# Patient Record
Sex: MALE | Race: BLACK OR AFRICAN AMERICAN | NOT HISPANIC OR LATINO | Employment: PART TIME | URBAN - METROPOLITAN AREA
[De-identification: names, ages, dates, MRNs, and addresses within clinical notes are randomized per-mention and may not be internally consistent; named-entity substitution may affect disease eponyms.]

---

## 2017-02-13 ENCOUNTER — COMMUNICATION - HEALTHEAST (OUTPATIENT)
Dept: TELEHEALTH | Facility: CLINIC | Age: 27
End: 2017-02-13

## 2017-02-13 ENCOUNTER — OFFICE VISIT - HEALTHEAST (OUTPATIENT)
Dept: FAMILY MEDICINE | Facility: CLINIC | Age: 27
End: 2017-02-13

## 2017-02-13 DIAGNOSIS — R68.89 FLU-LIKE SYMPTOMS: ICD-10-CM

## 2017-02-13 DIAGNOSIS — B34.9 VIRAL SYNDROME: ICD-10-CM

## 2017-02-13 DIAGNOSIS — J02.9 SORE THROAT: ICD-10-CM

## 2017-02-14 ENCOUNTER — COMMUNICATION - HEALTHEAST (OUTPATIENT)
Dept: FAMILY MEDICINE | Facility: CLINIC | Age: 27
End: 2017-02-14

## 2017-08-02 ENCOUNTER — TELEPHONE (OUTPATIENT)
Dept: OTHER | Facility: OUTPATIENT CENTER | Age: 27
End: 2017-08-02

## 2017-08-02 NOTE — TELEPHONE ENCOUNTER
Telephone Intake TG Adult  Date: 2017  Client Name:  Marion Latham  Preferred Name: Katherine    MRN: [id number]  : 1990     Age:  27  Caller Name: Self  Relationship to Client:      Presenting Problem / Reason for Assessment   (Clinical History &Symptoms):     FTM - Has not been on HRT for a least 1 yr due to Ins.    On and Off HRT for 4 yrs.  Family support:  Dad supportive, Mom - working on it  To finally complete transitioning        Clarify their goals/expected services from  medical care what are they looking for?    Clarify with patient (as necessary) that we are not a primary care clinic and that we do not have a surgeon. We strongly recommend that patients have a primary care doctor for their overall health needs, and we can refer to primary care clinics. We can assist with surgery referrals.  Surgical intervention (mastectomy) future        Length of time experiencing symptoms:  Since age 9      Seen Other Providers for Gender (if so, where):    M.D/other.(hormones) : HRT start  thru [facility]             Therapist:  [facility] - has moved out of state    Psychiatrist:  Dr. Perez ([facility]    Releases of Information sent to patient for completion        Other Medical/Mental Health Diagnoses:                       Gender Dysphoria                     Bi Polor                     Boarderline Personality      If needed, clarify if patient calling from group home or other supervised living arrangement - NA    Note if patient has no mental health or cognitive diagnosis, but phone behavior suggests impairment - NA      Medications:  NA      Primary Care Provider: No - Will be establishing care with [facility]                        Referral Source:  Word of Mouth      Follow Up:        Please Verify Registration    If patient has Docracy or Compete, send to .     Prep Welcome Packet and have patient come half hour beforehand to fill out  forms in packet (health history form, transgender history, Safety Screening, PHQ9, and ANDRIA-7.      Paperwork sent 8/2/2017  Appt: August 21 @ 11:20

## 2017-08-29 ENCOUNTER — OFFICE VISIT (OUTPATIENT)
Dept: OTHER | Facility: OUTPATIENT CENTER | Age: 27
End: 2017-08-29

## 2017-08-29 DIAGNOSIS — F64.0 GENDER DYSPHORIA IN ADOLESCENT AND ADULT: Primary | ICD-10-CM

## 2017-08-29 ASSESSMENT — ANXIETY QUESTIONNAIRES
3. WORRYING TOO MUCH ABOUT DIFFERENT THINGS: NOT AT ALL
1. FEELING NERVOUS, ANXIOUS, OR ON EDGE: SEVERAL DAYS
6. BECOMING EASILY ANNOYED OR IRRITABLE: SEVERAL DAYS
5. BEING SO RESTLESS THAT IT IS HARD TO SIT STILL: NOT AT ALL
GAD7 TOTAL SCORE: 4
2. NOT BEING ABLE TO STOP OR CONTROL WORRYING: NOT AT ALL
7. FEELING AFRAID AS IF SOMETHING AWFUL MIGHT HAPPEN: NOT AT ALL

## 2017-08-29 ASSESSMENT — PATIENT HEALTH QUESTIONNAIRE - PHQ9: 5. POOR APPETITE OR OVEREATING: MORE THAN HALF THE DAYS

## 2017-08-29 NOTE — MR AVS SNAPSHOT
After Visit Summary   2017    Marion Latham    MRN: [id number]           Patient Information     Date Of Birth          1990        Visit Information        Provider Department      2017 2:00 PM Alireza Juarez MD [facility]        Today's Diagnoses     Gender dysphoria in adolescent and adult    -  1       Follow-ups after your visit        Follow-up notes from your care team     Return when ready for hormone therapy.      Who to contact     Please call your clinic at [phone] to:    Ask questions about your health    Make or cancel appointments    Discuss your medicines    Learn about your test results    Speak to your doctor   If you have compliments or concerns about an experience at your clinic, or if you wish to file a complaint, please contact [facility] Patient Relations at [phone] or email us at [email or web address]         Additional Information About Your Visit        MyChart Information     TaskEasy is an electronic gateway that provides easy, online access to your medical records. With TaskEasy, you can request a clinic appointment, read your test results, renew a prescription or communicate with your care team.     To sign up for [employer]Chopt visit the website at [email or web address]   You will be asked to enter the access code listed below, as well as some personal information. Please follow the directions to create your username and password.     Your access code is: [email or web address]  Expires: 2017  1:52 PM     Your access code will  in 90 days. If you need help or a new code, please contact your [facility] or call [phone] for assistance.        Care EveryWhere ID     This is your Care EveryWhere ID. This could be used by other organizations to access your [facility] medical records  [id number]        Your Vitals Were     Pulse Height BMI (Body Mass Index)        "      67 1.676 m (5' 6\") 42.51 kg/m2          Blood Pressure from Last 3 Encounters:   08/29/17 133/87    Weight from Last 3 Encounters:   08/29/17 119.5 kg (263 lb 6.4 oz)               Primary Care Provider    Provider Unknown       No address on file        Equal Access to Services     ANTHONY GARCIA : Hadii aad ku hadasho Soomaali, waaxda luqadaha, qaybta kaalmada adeegyada, waxwilian hermiloin hayaan glenn stevens laascencionn ah. So [facility] [place] [phone].    ATENCIÓN: Si habla español, tiene a caraballo disposición servicios gratuitos de asistencia lingüística. Llame al [phone].    We comply with applicable federal civil rights laws and [place] laws. We do not discriminate on the basis of race, color, national origin, age, disability sex, sexual orientation or gender identity.            Thank you!     Thank you for choosing [facility]  for your care. Our goal is always to provide you with excellent care. Hearing back from our patients is one way we can continue to improve our services. Please take a few minutes to complete the written survey that you may receive in the mail after your visit with us. Thank you!             Your Updated Medication List - Protect others around you: Learn how to safely use, store and throw away your medicines at www.disposemymeds.org.          This list is accurate as of: 8/29/17 11:59 PM.  Always use your most recent med list.                   Brand Name Dispense Instructions for use Diagnosis    ABILIFY PO      Take  by mouth.        CENTROVITE Tabs      Take  by mouth.        EFFEXOR PO      Take  by mouth.        FERROUS SULFATE PO      Take  by mouth.        traZODone 50 MG tablet    DESYREL     Take  mg by mouth          "

## 2017-09-07 RX ORDER — TRAZODONE HYDROCHLORIDE 50 MG/1
50-100 TABLET, FILM COATED ORAL
COMMUNITY
Start: 2017-03-21 | End: 2020-07-08

## 2017-09-08 VITALS
HEART RATE: 67 BPM | HEIGHT: 66 IN | WEIGHT: 263.4 LBS | SYSTOLIC BLOOD PRESSURE: 133 MMHG | DIASTOLIC BLOOD PRESSURE: 87 MMHG | BODY MASS INDEX: 42.33 KG/M2

## 2017-09-08 PROBLEM — F64.0 GENDER DYSPHORIA IN ADOLESCENT AND ADULT: Status: ACTIVE | Noted: 2017-09-08

## 2017-09-08 NOTE — PROGRESS NOTES
Transgender psychosocial assessment and medical evaluation.      ID:  27 year-old transmasculine patient.      CHIEF CONCERN:  Restarting masculinizing hormone therapy.      HISTORY OF PRESENT ILLNESS:    Gender History:  Pt has a longstanding history of gender dysphoria.  He first recognized gender nonconformity around age 6 years old, when he tried to urinate standing up. He continued to experience gender differences in gender dysphoria throughout elementary and middle school; would try and stuff material into his pants to create the sensation of male genitalia; felt that something was missing. He would wear layers of clothing to hide breasts; this created conflict within the family.  He and his family were active in the [employer], he felt conflicted and ashamed about their gender dysphoria and gender differences. He came out in [employer] to his family but it was not until he went away to college that he was able to express gender and feel more comfortable being more masculine and authentic.       During his time at [facility], he began experiencing mental health disturbances and was ultimately hospitalized several times; was diagnosed with bipolar disorder and borderline personality in 2009 and ended up taking medical leave from [facility]. He was recommended to return to the [facility] for further treatment.  [facility]  at the end of 2009 to 2010; and then he saw Dr. Callum Gil at [facility] for gender therapy when returned to the [facility].  However, since that time, Dr. Nolen has moved out of state and is now looking for new overall and gender related provider.     Hormone History:  Patient began masculinization with testosterone therapy in 2013 with the support of Callum Gil. At that time, pt's wife and twin sister were supportive.  He had a job and was psychiatrically stable.  He began hormone therapy with Dr. Ortiz  Luis in [place], [place], using intramuscular testosterone on a weekly basis. The dose is unclear at this time; patient recalls being on 0.5 mL.  He continued with Dr. Smith w/ the dose being doubled and remained on this dose for several months. Then his insurance was discontinued and he could not afford office visits or medication. He was off testosterone at  that time for 3-4 months,  and then restarted hormones with  Dr. Racquel Garcia at [facility] in 2014; was placed on IM testosterone every 2 weeks.  Dose is unknown - perhaps 1 mL.  This continued for 8-9 months until Dr Garcia was not in network.  He was then off testosterone for 6 months and restarted with a Dr. Sanjeev ERYNOSO at [facility]-again approximately 1 mL every 2 weeks, and increasing to 1-1/2 MLs every 2 weeks.  This continued for 9-10 months.  Pt then moved and changed providers to Dr. CARLENE Feliciano in [facility]; continued on the same dosage for 8 months. Patient then lost insurance and has been without testosterone therapy for over a year.  He has recently restarted a new job with full health benefits and is returning for restart of hormones.  In total, he has received approximately  32 months of  hormone therapy, dosages unknown.       Prior to hormones patient's periods were regular.  He had some mild degree of chin hair.  Menses stopped within the use of the first dose of hormone therapy and even hormones stopped he remained amenorrheic for 5-6 months afterwards. He noted with hormone therapy significant increases in body and facial hair, his shoulders became broader and face thinner. It was easier to lose weight, had increased libido & energy, and he felt his mood became more level. He had less mood swings.  He did notice an increase in voice changes, and increased acne.  He denied any significant side effects from hormone therapy except perhaps for increased joint pain and appetite, but overall anemia he had experienced  prior had improved. After stopping hormone therapy, facial hair, body hair were reduced.  Periods returned and became regular again-last menstrual period was August 08/25/17.  Otherwise, they are moderate to heavy flow lasting 3-5 days.       PAST Medical History: hospitalized in 2009 and 2010 with bipolar disorder at [facility]. Was in a group home in 2010 to receive help quicker; has been hospitalized a total of 4 times for psychiatric illness. No surgeries, no other hospitalizations. 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  Pt is also a twin.      FAMILY HISTORY:  Mother with depression, anemia; father's medical history is unknown-there is depression and chemical dependency on his side of the family.  Paternal grandfather with bipolar disorder & schizophrenia.  Sister with depression and ulcers.  Maternal grandmother with lipids, hypertension, chemical dependency and anemia; grandfather with hypertension, possible TB and heart disease, maternal uncle with stroke.  Maternal aunt with diabetes, and thyroid problems on the maternal side.      SOCIAL HISTORY:  The patient smokes 2-3 cigarettes per week.  Alcohol use twice a month, 1 drink per sitting, last marijuana use was in 2016.  Standard diet including dairy.  Activity: engages in some cardio, push-ups and running daily.  He is looking for a new primary care provider.      SEXUAL History: has had 1 partner in the lifetime, which is his wife.  No STIs; past Pap tests have all been normal and is due for one in 1 year; has been tested negative for HIV and is hep B vaccinated.  Has done 2 out of the 3 HPV vaccines in [place].      PHYSICAL EXAM:   GENERAL:  The patient is alert and in no apparent distress.  Blood pressure using a large cuff on the left arm 133/87; pulse 67, height 5 foot 6.  Weight 263.4 pounds.  Speech regular rate and rhythm, mood appears euthymic.  No psychomotor  changes.  Thought processes appropriate.  Pupils equal and reactive to light, disks sharp bilaterally without hemorrhages or exudates.  Oropharynx with no lesions, no erythema.   NECK:  Supple, no thyroid enlargement, no carotid bruits, no adenopathy.   HEART:  S1S2, no murmurs.   LUNGS:  Clear to auscultation bilaterally.   ABDOMEN:  Soft, nontender, normal bowel sounds, no liver or spleen enlargement, no masses.   EXTREMITIES:  Positive pedal pulses bilaterally.  Feet are small with absence of toes on the left foot completely and small or absent toes on the right foot.       ASSESSMENT:   1.  Gender dysphoria.  History of accumulated approximately 32 months of intramuscular testosterone therapy, while being off of testosterone for 1 year most recently; dose of previous hormone use unknown.  No recent laboratory work available.     Risks and benefits of masculinizing hormone therapy were again reviewed with the patient, particularly in light of patient's obesity and mood disorders.  --Will request records from previous providers and order current baseline labs including hemoglobin A1c, lipid panel, hepatic panel and fasting glucose, current testosterone level.  --- Patient has homemade tattoos also noted on exam. Will get a hepatitis C antibody.   --Given pt's history of bipolar disorder would not wish to start hormone therapy until he has reestablished with a psychiatrist and is back on medication therapy, discussed the importance of maintaining mental health care with patient.  --would reintroduce an appropriate dosage of intramuscular hormones, since apparently pt had tolerated them in the past without intercurrent hospitalizations. Would want to keep the dose non-super physiologic and monitor mood appropriately.     2.  Obesity.  We will check hemoglobin A1c & TSH and recommend ongoing exercise and appropriate dietary measures to control potential for weight gain.   Follow up after labs are done and patient has  established care with Psychiatry.

## 2017-09-11 ASSESSMENT — PATIENT HEALTH QUESTIONNAIRE - PHQ9: SUM OF ALL RESPONSES TO PHQ QUESTIONS 1-9: 10

## 2017-09-12 ASSESSMENT — ANXIETY QUESTIONNAIRES: GAD7 TOTAL SCORE: 4

## 2017-09-12 NOTE — PROGRESS NOTES
ID:  27-year-old transmasculine patient.      CHIEF CONCERN:  To restart masculinizing hormone therapy.      HISTORY OF PRESENT ILLNESS:     1.  Gender history. Patient first recognized their gender nonconforming around age 7-8, when they first tried to urinate standing up. This gender nonconformity and dysphoria continued through middle school.  They felt something missing regarding their genitalia; would often try and stuff something into their pants, and they would dress in layers due to discomfort with their chest. They came out in middle school to their family; patient grew up in the [employer] and was quite conflicted and felt ashamed or disgusted by their gender nonconformity-it wasn't until they went away to college that they were able to be more comfortable with their authentic self.  They have been working with Dr. Callum Gil at [facility] regarding gender therapy over their gender dysphoria.  Pt has experienced significant psychological distress during their time at [facility] and started working with Dr. Gil at that time. They were diagnosed with bipolar disorder and borderline personality disorder in 2009, and were admitted to the hospital for psychiatric concerns a few times; was recommended to come back to the [facility] for mental health treatment as well as treatment for their gender issues.      Hormone treatment history:  Pt started testosterone treatment in 2013 with the support of Callum Gil. The patient's wife and twin sister were supportive.  At that time, pt had a job and was psychiatrically stable. Pt initially was started on testosterone with Dr. Smith in [place], [place]. The initial dose is unclear; according to the patient was started on 10 mg IM weekly.  Pt describes this dose as 0.05 MLs weekly-this was increased to 20 mg weekly and continued over the course of several months.  At that point, his insurance changed and could not afford to see   Luis or do medications.  Patient was off testosterone for about 3-4 months in 2013; started again with Dr. Clement ___ at [facility] and in 2014 was on 30 mg, apparently, every 2 weeks.  This continued for about 8-9 months and insurance changed again; patient was off testosterone for about 6 months.  He restarted with Dr. Pace (last name is unclear) at [facility] in [place] on 30 mg every 2 weeks, and eventually 40 mg every 2 weeks from 9/2015, and continued for 9-10 months.  He then moved and was not close to the [facility] and changed to DrPati _______ at [facility] in [place]; was continued on 40 mg every 2 weeks for the next 8 months-insurance then was topped at 7 months and has not had testosterone for 1 year.  He moved back to [place] and now has new job with ________.      Medical History: prior to hormones patient's periods were regular; periods stopped with just the 30 mg dose and ptwould go 5-6 months even after this dose was stopped before would then begin spotting again. He noted increased body and facial hair, although he had some facial hair prior to hormones and also, had some mild body hair. However, after beginning hormone therapy had developed sideburns and some small beard; after stopping hormones this became significantly thinner and pt lost the beard afterwards. Shoulders became broader, face thinner, lost weight, increased libido and increased energy; mood became more level and had decreased mood swings.  Pt experienced acne and some voice pitch change.  Pt denies any side effects from testosterone therapy; noted that appetite is increased.  There was a mild increase in joint pain but anemia improved.  Pt is looking for new therapist as Callum has moved out of [facility]; has an appointment with [facility] to reestablish psychiatry next week.     Current medical problems include a history of anemia, childhood asthma, bipolar disorder-unclear whether this is bipolar 1 or 2, last episode of  depressed mood was 2 weeks ago and hypomanic mood was 1-1/2 weeks ago; has borderline personality disorder.     Current medications:  patient's psychiatrist retired recently and has been off psychiatric medications for 1 year; previously had been on lithium and Abilify and these have been very helpful for the patient. Pt is also on iron 325 mg daily; of note, patient describes a manic episode of having a burst of energy, talking fast, will sometimes rock back and forth, hyperventilate a bit, and has decreased sleep needs.  Of note, last week patient rarely heard voices. Pt has done DBT in the past; will sometimes feel emotionally disconnected, distant or irritable. With a decreased mood, pt has had no suicidal ideation over the last 2 weeks but 2 months ago had some severe depression.      PAST Medical History: hospitalized in 2009 and 2010 with bipolar disorder at [facility]. Was in a group home in 2010 to receive help quicker; has been hospitalized a total of 4 times for psychiatric illness. No surgeries, no other hospitalizations. 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  Pt is also a twin.      FAMILY HISTORY:  Mother with depression, anemia; father's medical history is unknown-there is depression and chemical dependency on his side of the family.  Paternal grandfather with bipolar disorder & schizophrenia.  Sister with depression and ulcers.  Maternal grandmother with lipids, hypertension, chemical dependency and anemia; grandfather with hypertension, possible TB and heart disease, maternal uncle with stroke.  Maternal aunt with diabetes, and thyroid problems on the maternal side.      SOCIAL HISTORY:  The patient smokes 2-3 cigarettes per week.  Alcohol use twice a month, 1 drink per sitting, last marijuana use was in 2016.  Standard diet including dairy.  Activity: engages in some cardio, push-ups and running daily.  He is  looking for a new primary care provider.      SEXUAL History: has had 1 partner in the lifetime, which is his wife.  No STIs; past Pap tests have all been normal and is due for one in 1 year; has been tested negative for HIV and is hep B vaccinated.  Has done 2 out of the 3 HPV vaccines in [place].      PHYSICAL EXAM:   GENERAL:  The patient is alert and in no apparent distress.  Blood pressure using a large cuff on the left arm 133/87; pulse 67, height 5 foot 6.  Weight 263.4 pounds.  Speech regular rate and rhythm, mood appears euthymic.  No psychomotor changes.  Thought processes appropriate.  Pupils equal and reactive to light, disks sharp bilaterally without hemorrhages or exudates.  Oropharynx with no lesions, no erythema.   NECK:  Supple, no thyroid enlargement, no carotid bruits, no adenopathy.   HEART:  S1S2, no murmurs.   LUNGS:  Clear to auscultation bilaterally.   ABDOMEN:  Soft, nontender, normal bowel sounds, no liver or spleen enlargement, no masses.   EXTREMITIES:  Positive pedal pulses bilaterally.  Feet are small with absence of toes on the left foot completely and small or absent toes on the right foot.

## 2017-09-13 NOTE — PROGRESS NOTES
Patient's current medical problems include history of anemia, childhood asthma, depression, borderline personality disorder and bipolar disorder.  The patient is not currently on any medications.  Pt's psychiatrist retired recently and the patient has been off medications x1 year. Had been on lithium and Abilify; continues to have now cycling manic and depressive episodes, with the last manic episode 1-1/2 weeks ago and last depressed episode 2 weeks ago.  Pt describes manic episodes as being characterized by bursts of energy and fast speech, decreased sleep needs-has in the past had auditory hallucinations. Pt has done DBT, sometimes feeling emotionally disconnected or irritable.  Pt has had depressed mood, but no suicidal ideation over the last 2 weeks; did have severe depression 2 months ago.  He does have an appointment at [facility] for psychiatry next week.      PAST Medical History: hospitalizations in 2009 and 2010 for bipolar disorder, and recalls a total of 4 hospitalizations for psychiatric illness.  No surgeries or other hospitalizations. The patient was born prematurely by 2 months and is a twin; his feet did not fully develop and are quite small in size and are missing toes on both feet.      FAMILY HISTORY:  Mother with depression and anemia.  Father medical history is unknown; history of depression and chemical dependency on his side of the family.  Paternal grandmother with bipolar disorder or schizophrenia.  Sister with depression and ulcers.  Paternal grandmother with lipids, hypertension and chemical dependency and anemia; grandfather with hypertension, heart disease, possible TB; maternal uncle with stroke.  Maternal aunt with diabetes.  Maternal side of the family with hypothyroid issues.      SOCIAL HISTORY:  The patient smokes 2-3 cigarettes a week, alcohol twice a month, 1 drink per sitting.  Past marijuana use with last use in 2016.  Eats a standard diet including dairy.  Pt does daily activity  with cardio, pushups, or running; has no primary care provider and is looking to find a new one.      SEXUAL HISTORY:  The patient has 1 partner in the lifetime, his wife. He has never been pregnant.  No STIs.  Has been tested negative for HIV in the past.  Pap test has been normal in the past; due for one in 1 year.  Has been vaccinated against hepatitis B, has received 2/3 HPV vaccines.      PHYSICAL EXAM:   GENERAL:  Pt is alert and in no apparent distress.  Vital signs are within normal limits except for weight, which shows obesity with a body mass index of 42.5.  Speech regular rate and rhythm, mood appears essentially euthymic.  No psychomotor changes.  Thought processes are normal.  Pupils equal and reactive to light, disks sharp bilaterally.  Oropharynx with no lesions.   NECK:  Supple, no thyroid enlargement, no carotid bruits, no adenopathy.   HEART:  S1S2, no murmurs, lungs are clear to auscultation bilaterally.   ABDOMEN:  Soft, nontender, normal bowel sounds, no liver or spleen enlargement, no masses.   EXTREMITIES:  Positive pedal pulses bilaterally.  No edema.  Feet are small with toes as noted above.   NEUROLOGIC:  Cranial nerves 2-12 are intact, deep tendon reflexes 2/4 and symmetric throughout, strength 5/5 and symmetric throughout, gait and station are grossly intact.  There is extra facial and body hair consistent with previous hormone therapy.

## 2017-10-17 DIAGNOSIS — F64.0 GENDER DYSPHORIA IN ADOLESCENT AND ADULT: ICD-10-CM

## 2017-10-17 LAB
ALT SERPL W P-5'-P-CCNC: 45 U/L (ref 0–50)
AST SERPL W P-5'-P-CCNC: 44 U/L (ref 0–45)
CHOLEST SERPL-MCNC: 135 MG/DL
GLUCOSE SERPL-MCNC: 90 MG/DL (ref 70–99)
HBA1C MFR BLD: 5.6 % (ref 4.1–5.7)
HCV AB SERPL QL IA: NONREACTIVE
HDLC SERPL-MCNC: 50 MG/DL
LDLC SERPL CALC-MCNC: 76 MG/DL
NONHDLC SERPL-MCNC: 85 MG/DL
TRIGL SERPL-MCNC: 46 MG/DL
TSH SERPL DL<=0.005 MIU/L-ACNC: 0.69 MU/L (ref 0.4–4)

## 2017-10-19 LAB
SHBG SERPL-SCNC: 35 NMOL/L (ref 30–135)
TESTOST FREE SERPL-MCNC: 0.24 NG/DL (ref 0.08–0.74)
TESTOST SERPL-MCNC: 14 NG/DL (ref 8–60)

## 2017-10-22 ENCOUNTER — HEALTH MAINTENANCE LETTER (OUTPATIENT)
Age: 27
End: 2017-10-22

## 2017-10-23 ENCOUNTER — OFFICE VISIT (OUTPATIENT)
Dept: OTHER | Facility: OUTPATIENT CENTER | Age: 27
End: 2017-10-23

## 2017-10-23 VITALS
BODY MASS INDEX: 42.91 KG/M2 | SYSTOLIC BLOOD PRESSURE: 122 MMHG | HEIGHT: 66 IN | WEIGHT: 267 LBS | HEART RATE: 63 BPM | DIASTOLIC BLOOD PRESSURE: 79 MMHG

## 2017-10-23 DIAGNOSIS — F64.0 GENDER DYSPHORIA IN ADOLESCENT AND ADULT: Primary | ICD-10-CM

## 2017-10-23 RX ORDER — TESTOSTERONE CYPIONATE 200 MG/ML
50 INJECTION, SOLUTION INTRAMUSCULAR WEEKLY
Qty: 2 ML | Refills: 2 | Status: SHIPPED | OUTPATIENT
Start: 2017-10-23 | End: 2020-07-08

## 2017-10-23 RX ORDER — LITHIUM CARBONATE 300 MG
300 TABLET ORAL 3 TIMES DAILY
COMMUNITY

## 2017-10-23 RX ORDER — ACETAMINOPHEN 325 MG/1
325-650 TABLET ORAL
COMMUNITY
Start: 2017-09-28 | End: 2020-07-08

## 2017-10-23 RX ORDER — ONDANSETRON 4 MG/1
4 TABLET, ORALLY DISINTEGRATING ORAL
COMMUNITY
Start: 2017-09-28 | End: 2020-07-08

## 2017-10-23 ASSESSMENT — ENCOUNTER SYMPTOMS
CHEST TIGHTNESS: 0
LIGHT-HEADEDNESS: 0
CHILLS: 0
NERVOUS/ANXIOUS: 0
SHORTNESS OF BREATH: 0
ABDOMINAL PAIN: 0
UNEXPECTED WEIGHT CHANGE: 0
PALPITATIONS: 0
FREQUENCY: 0
POLYDIPSIA: 0
FEVER: 0
NUMBNESS: 0
WEAKNESS: 0
HEADACHES: 0
VOMITING: 0
DYSPHORIC MOOD: 0

## 2017-10-23 NOTE — MR AVS SNAPSHOT
After Visit Summary   10/23/2017    Marion Latham    MRN: [id number]           Patient Information     Date Of Birth          1990        Visit Information        Provider Department      10/23/2017 10:00 AM Alireza Juarez MD [facility]        Today's Diagnoses     Gender dysphoria in adolescent and adult    -  1      Care Instructions    1. Start 50 mg testosterone weekly (0.25 mL)  2. Do lab at [place] in 1 month 3-4 days after injection  3. Reduce starches/carbs in diet  4. Add swimming or biking to activity if possible  5. See Dr. Juarez in 3 months, sooner if needed          Follow-ups after your visit        Follow-up notes from your care team     Return in about 3 months (around 2018).      Who to contact     Please call your clinic at [phone] to:    Ask questions about your health    Make or cancel appointments    Discuss your medicines    Learn about your test results    Speak to your doctor   If you have compliments or concerns about an experience at your clinic, or if you wish to file a complaint, please contact [facility] Patient Relations at [phone] or email us at [email or web address]         Additional Information About Your Visit        MyChart Information     [employer] is an electronic gateway that provides easy, online access to your medical records. With Directly, you can request a clinic appointment, read your test results, renew a prescription or communicate with your care team.     To sign up for [employer] visit the website at [email or web address]   You will be asked to enter the access code listed below, as well as some personal information. Please follow the directions to create your username and password.     Your access code is: [email or web address]  Expires: 2017  1:52 PM     Your access code will  in 90 days. If you need help or a new code, please contact your [facility] or call [phone] for assistance.        Care EveryWhere ID     This is your Care EveryWhere ID. This could be used by other organizations to access your [place] medical records  [id number]        Your Vitals Were     Pulse Height Last Period BMI (Body Mass Index)          63 1.676 m (5' 6\") 09/24/2017 (Approximate) 43.09 kg/m2         Blood Pressure from Last 3 Encounters:   10/23/17 122/79   08/29/17 133/87    Weight from Last 3 Encounters:   10/23/17 121.1 kg (267 lb)   08/29/17 119.5 kg (263 lb 6.4 oz)              Today, you had the following     No orders found for display         Today's Medication Changes          These changes are accurate as of: 10/23/17 11:59 PM.  If you have any questions, ask your nurse or doctor.               Start taking these medicines.        Dose/Directions    needle (disp) 18G X 1\" Misc   Used for:  Gender dysphoria in adolescent and adult        To use to draw up IM medication weekly   Quantity:  25 each   Refills:  3       Needle (Disp) 25G X 1\" Misc   Used for:  Gender dysphoria in adolescent and adult        To use with weekly IM injection   Quantity:  25 each   Refills:  3       syringe (disposable) 1 ML Misc   Used for:  Gender dysphoria in adolescent and adult        To use with IM weekly injections   Quantity:  25 each   Refills:  3       testosterone cypionate 200 MG/ML injection   Commonly known as:  DEPOTESTOTERONE   Used for:  Gender dysphoria in adolescent and adult        Dose:  50 mg   Inject 0.25 mLs (50 mg) into the muscle once a week   Quantity:  2 mL   Refills:  2         Stop taking these medicines if you haven't already. Please contact your care team if you have questions.     EFFEXOR PO                Where to get your medicines      These medications were sent to [facility] [address] [facility] [address] [facility] & [address] [address]     Phone:  [phone]     needle " "(disp) 18G X 1\" Misc    Needle (Disp) 25G X 1\" Misc    syringe (disposable) 1 ML Misc         Some of these will need a paper prescription and others can be bought over the counter.  Ask your nurse if you have questions.     Bring a paper prescription for each of these medications     testosterone cypionate 200 MG/ML injection                Primary Care Provider Office Phone # Fax #    [facility] [phone]       [address] [place] [address]        Equal Access to Services     ANTHONY GARCIA : Hadii aad ku hadasho Soomaali, waaxda luqadaha, qaybta kaalmada adeegyada, waxay hermiloin hayneyda ryder. So [facility] [phone].    ATENCIÓN: Si habla español, tiene a caraballo disposición servicios gratuitos de asistencia lingüística. [facility] [phone].    We comply with applicable federal civil rights laws and [place] laws. We do not discriminate on the basis of race, color, national origin, age, disability, sex, sexual orientation, or gender identity.            Thank you!     Thank you for choosing [facility]  for your care. Our goal is always to provide you with excellent care. Hearing back from our patients is one way we can continue to improve our services. Please take a few minutes to complete the written survey that you may receive in the mail after your visit with us. Thank you!             Your Updated Medication List - Protect others around you: Learn how to safely use, store and throw away your medicines at www.disposemymeds.org.          This list is accurate as of: 10/23/17 11:59 PM.  Always use your most recent med list.                   Brand Name Dispense Instructions for use Diagnosis    ABILIFY PO      Take  by mouth.        acetaminophen 325 MG tablet    TYLENOL     Take 325-650 mg by mouth        CENTROVITE Tabs      Take  by mouth.        FERROUS SULFATE PO      Take  by mouth.        lithium 300 MG tablet      Take 300 mg by mouth 3 " "times daily        needle (disp) 18G X 1\" Misc     25 each    To use to draw up IM medication weekly    Gender dysphoria in adolescent and adult       Needle (Disp) 25G X 1\" Misc     25 each    To use with weekly IM injection    Gender dysphoria in adolescent and adult       ondansetron 4 MG ODT tab    ZOFRAN-ODT     Take 4 mg by mouth        syringe (disposable) 1 ML Misc     25 each    To use with IM weekly injections    Gender dysphoria in adolescent and adult       testosterone cypionate 200 MG/ML injection    DEPOTESTOTERONE    2 mL    Inject 0.25 mLs (50 mg) into the muscle once a week    Gender dysphoria in adolescent and adult       traZODone 50 MG tablet    DESYREL     Take  mg by mouth          "

## 2017-10-23 NOTE — PROGRESS NOTES
KELLY Murphy is a 27 year old individual that uses pronouns He/Him/His/Himself that presents today for follow up of:  masculinizing hormone therapy.   Gender identity: male.   Started Hormone  Therapy: started 2013-216 interimittent  Continues on n/a  Any special concerns today?  He is back on psychiatric medications: Abilify and lithium; working with Dr. Mariana Meyer [facility].   [address].     Pt. Here with wife who is CMA and does most of injections, in deltoid or buttock. Pt. Will self inject in thigh. LMP 9/24/2017  On hormones?  No  Gender related body changes since last visit: n/a    Activity: Walking and working at [employer]  New health concerns since last visit: Dropping weigh--lost 3 lbs  ---    No past surgical history on file.    Patient Active Problem List   Diagnosis     Asthma     Bipolar 1 disorder (H)     Borderline personality disorder     Congenital deformity     Obesity     Gender dysphoria in adolescent and adult       Current Outpatient Prescriptions   Medication Sig Dispense Refill     acetaminophen (TYLENOL) 325 MG tablet Take 325-650 mg by mouth       lithium 300 MG tablet Take 300 mg by mouth 3 times daily       ARIPiprazole (ABILIFY PO) Take  by mouth.       FERROUS SULFATE PO Take  by mouth.       ondansetron (ZOFRAN-ODT) 4 MG ODT tab Take 4 mg by mouth       traZODone (DESYREL) 50 MG tablet Take  mg by mouth       Multiple Vitamins-Minerals (CENTROVITE) TABS Take  by mouth.         History   Smoking Status     Never Smoker   Smokeless Tobacco     Never Used        No Known Allergies    Health Maintenance Due   Topic Date Due     TETANUS IMMUNIZATION (SYSTEM ASSIGNED)  03/11/2007     PAP SCREENING Q3 YR (SYSTEM ASSIGNED)  03/11/2010     EYE EXAM Q1 YEAR  06/17/2012     INFLUENZA VACCINE (SYSTEM ASSIGNED)  09/01/2017         Problem, Medication and Allergy Lists were reviewed and are current..         Review of Systems:   Review of Systems  "  Constitutional: Negative for chills, fever and unexpected weight change.   Eyes: Negative for visual disturbance.   Respiratory: Negative for chest tightness and shortness of breath.    Cardiovascular: Negative for chest pain, palpitations and leg swelling.   Gastrointestinal: Negative for abdominal pain and vomiting.   Endocrine: Negative for polydipsia and polyuria.   Genitourinary: Negative for frequency.   Neurological: Negative for weakness, light-headedness, numbness and headaches.   Psychiatric/Behavioral: Negative for dysphoric mood and suicidal ideas. The patient is not nervous/anxious.             Physical Exam:     Vitals:    10/23/17 1016   BP: 122/79   Pulse: 63   Weight: 121.1 kg (267 lb)   Height: 1.676 m (5' 6\")     BMI= Body mass index is 43.09 kg/(m^2).   Wt Readings from Last 10 Encounters:   10/23/17 121.1 kg (267 lb)   08/29/17 119.5 kg (263 lb 6.4 oz)     Appearance: Male appearance and dress    GENERAL:: healthy, alert and no distress  RESP: lungs clear to auscultation - no rales, no rhonchi, no wheezes  CV: regular rates and rhythm, normal S1 S2, no S3 or S4 and no murmur, no click or rub -  Voice, skin/ Breast, hips per flowsheet    Affect: Appropriate/mood-congruent           Labs:   Results from last visit:  Orders Only on 10/17/2017   Component Date Value Ref Range Status     Testosterone Total 10/17/2017 14  8 - 60 ng/dL Final    Comment: This test was developed and its performance characteristics determined by the   [facility],  [facility]. It has   not been cleared or approved by the FDA. The laboratory is regulated under   CLIA as qualified to perform high-complexity testing. This test is used for   clinical purposes. It should not be regarded as investigational or for   research.       Sex Hormone Binding Globulin 10/17/2017 35  30 - 135 nmol/L Final     Free Testosterone Calculated 10/17/2017 0.24  0.08 - 0.74 ng/dL Final    Comment: " "18-30 Years 0.08-0.74 ng/dL  31-40 Years 0.13-0.92 ng/dL  41-51 Years 0.11-0.58 ng/dL  Postmenopausal 0.06-0.38 ng/dL       Hemoglobin A1C 10/17/2017 5.6  4.1 - 5.7 % Final     Hepatitis C Antibody 10/17/2017 Nonreactive  NR^Nonreactive Final    Comment: Assay performance characteristics have not been established for newborns,   infants, and children       TSH 10/17/2017 0.69  0.40 - 4.00 mU/L Final     Cholesterol 10/17/2017 135  <200 mg/dL Final     Triglycerides 10/17/2017 46  <150 mg/dL Final     HDL Cholesterol 10/17/2017 50  >49 mg/dL Final     LDL Cholesterol Calculated 10/17/2017 76  <100 mg/dL Final    Desirable:       <100 mg/dl     Non HDL Cholesterol 10/17/2017 85  <130 mg/dL Final     ALT 10/17/2017 45  0 - 50 U/L Final     AST 10/17/2017 44  0 - 45 U/L Final     Glucose 10/17/2017 90  70 - 99 mg/dL Final       Assessment and Plan   1 Gender dysphoria  2. Morbid obesity  Start Depo Testosterone 50 mg IM weekly, using 200 mg/mL strength, 1 mL syringe, 23 gauge 1\" needle. Pt. Instructed in how to draw up dose accurately and in IM injection technique.  Check testosterone level day 3-4 after injection in  1 Month    Counseled patient to reduce carbs/starches in diet, add swimming or biking to activity      Follow up:  Follow up in 3 months.  Results by mychart  Questions were elicited and answered.     Alireza Juarez MD    "

## 2017-10-23 NOTE — NURSING NOTE
"Chief Complaint   Patient presents with     RECHECK     TG       Vitals:    10/23/17 1016   BP: 122/79   Pulse: 63   Weight: 121.1 kg (267 lb)   Height: 1.676 m (5' 6\")       Body mass index is 43.09 kg/(m^2).      Rebecca Singh CMA    "

## 2017-10-23 NOTE — PATIENT INSTRUCTIONS
1. Start 50 mg testosterone weekly (0.25 mL)  2. Do lab at [place] in 1 month 3-4 days after injection  3. Reduce starches/carbs in diet  4. Add swimming or biking to activity if possible  5. See Dr. Juarez in 3 months, sooner if needed

## 2020-07-07 ENCOUNTER — VIRTUAL VISIT (OUTPATIENT)
Dept: FAMILY MEDICINE | Facility: OTHER | Age: 30
End: 2020-07-07

## 2020-07-08 ENCOUNTER — HOSPITAL ENCOUNTER (OUTPATIENT)
Facility: CLINIC | Age: 30
Setting detail: OBSERVATION
Discharge: HOME | End: 2020-07-09
Attending: EMERGENCY MEDICINE | Admitting: HOSPITALIST
Payer: OTHER GOVERNMENT

## 2020-07-08 ENCOUNTER — APPOINTMENT (OUTPATIENT)
Dept: GENERAL RADIOLOGY | Facility: CLINIC | Age: 30
End: 2020-07-08
Attending: EMERGENCY MEDICINE
Payer: OTHER GOVERNMENT

## 2020-07-08 DIAGNOSIS — R07.89 CHEST TIGHTNESS: ICD-10-CM

## 2020-07-08 DIAGNOSIS — R68.83 CHILLS: ICD-10-CM

## 2020-07-08 DIAGNOSIS — M79.10 MYALGIA: ICD-10-CM

## 2020-07-08 PROBLEM — R07.9 CHEST PAIN: Status: ACTIVE | Noted: 2020-07-08

## 2020-07-08 LAB
ALBUMIN SERPL-MCNC: 3.3 G/DL (ref 3.4–5)
ALBUMIN UR-MCNC: 10 MG/DL
ALP SERPL-CCNC: 51 U/L (ref 40–150)
ALT SERPL W P-5'-P-CCNC: 36 U/L (ref 0–50)
ANION GAP SERPL CALCULATED.3IONS-SCNC: 3 MMOL/L (ref 3–14)
APPEARANCE UR: CLEAR
APTT PPP: 30 SEC (ref 22–37)
AST SERPL W P-5'-P-CCNC: 25 U/L (ref 0–45)
BACTERIA #/AREA URNS HPF: ABNORMAL /HPF
BASOPHILS # BLD AUTO: 0 10E9/L (ref 0–0.2)
BASOPHILS NFR BLD AUTO: 0 %
BILIRUB DIRECT SERPL-MCNC: <0.1 MG/DL (ref 0–0.2)
BILIRUB SERPL-MCNC: 0.4 MG/DL (ref 0.2–1.3)
BILIRUB UR QL STRIP: NEGATIVE
BUN SERPL-MCNC: 6 MG/DL (ref 7–30)
CALCIUM SERPL-MCNC: 8.7 MG/DL (ref 8.5–10.1)
CHLORIDE SERPL-SCNC: 109 MMOL/L (ref 94–109)
CK SERPL-CCNC: 504 U/L (ref 30–225)
CO2 SERPL-SCNC: 29 MMOL/L (ref 20–32)
COLOR UR AUTO: YELLOW
CREAT SERPL-MCNC: 1.09 MG/DL (ref 0.52–1.04)
CRP SERPL-MCNC: 9 MG/L (ref 0–8)
D DIMER PPP FEU-MCNC: 0.3 UG/ML FEU (ref 0–0.5)
DIFFERENTIAL METHOD BLD: ABNORMAL
EOSINOPHIL # BLD AUTO: 0 10E9/L (ref 0–0.7)
EOSINOPHIL NFR BLD AUTO: 0 %
ERYTHROCYTE [DISTWIDTH] IN BLOOD BY AUTOMATED COUNT: 13.8 % (ref 10–15)
GFR SERPL CREATININE-BSD FRML MDRD: 67 ML/MIN/{1.73_M2}
GLUCOSE SERPL-MCNC: 141 MG/DL (ref 70–99)
GLUCOSE UR STRIP-MCNC: NEGATIVE MG/DL
HCG UR QL: NEGATIVE
HCT VFR BLD AUTO: 41.4 % (ref 35–47)
HGB BLD-MCNC: 13.1 G/DL (ref 11.7–15.7)
HGB UR QL STRIP: NEGATIVE
INR PPP: 1.13 (ref 0.86–1.14)
INTERPRETATION ECG - MUSE: NORMAL
KETONES UR STRIP-MCNC: 5 MG/DL
LEUKOCYTE ESTERASE UR QL STRIP: ABNORMAL
LYMPHOCYTES # BLD AUTO: 0.8 10E9/L (ref 0.8–5.3)
LYMPHOCYTES NFR BLD AUTO: 20 %
MCH RBC QN AUTO: 27.2 PG (ref 26.5–33)
MCHC RBC AUTO-ENTMCNC: 31.6 G/DL (ref 31.5–36.5)
MCV RBC AUTO: 86 FL (ref 78–100)
MONOCYTES # BLD AUTO: 0.5 10E9/L (ref 0–1.3)
MONOCYTES NFR BLD AUTO: 14 %
MUCOUS THREADS #/AREA URNS LPF: PRESENT /LPF
NEUTROPHILS # BLD AUTO: 2.6 10E9/L (ref 1.6–8.3)
NEUTROPHILS NFR BLD AUTO: 66 %
NITRATE UR QL: NEGATIVE
PH UR STRIP: 6 PH (ref 5–7)
PLATELET # BLD AUTO: 184 10E9/L (ref 150–450)
PLATELET # BLD EST: ABNORMAL 10*3/UL
POTASSIUM SERPL-SCNC: 4.1 MMOL/L (ref 3.4–5.3)
PROT SERPL-MCNC: 7.5 G/DL (ref 6.8–8.8)
RBC # BLD AUTO: 4.81 10E12/L (ref 3.8–5.2)
RBC #/AREA URNS AUTO: 1 /HPF (ref 0–2)
RBC MORPH BLD: ABNORMAL
SODIUM SERPL-SCNC: 141 MMOL/L (ref 133–144)
SOURCE: ABNORMAL
SP GR UR STRIP: 1.02 (ref 1–1.03)
SQUAMOUS #/AREA URNS AUTO: 1 /HPF (ref 0–1)
TROPONIN I SERPL-MCNC: 0.04 UG/L (ref 0–0.04)
UROBILINOGEN UR STRIP-MCNC: 2 MG/DL (ref 0–2)
WBC # BLD AUTO: 3.9 10E9/L (ref 4–11)
WBC #/AREA URNS AUTO: 3 /HPF (ref 0–5)

## 2020-07-08 PROCEDURE — 80076 HEPATIC FUNCTION PANEL: CPT | Performed by: PHYSICIAN ASSISTANT

## 2020-07-08 PROCEDURE — U0003 INFECTIOUS AGENT DETECTION BY NUCLEIC ACID (DNA OR RNA); SEVERE ACUTE RESPIRATORY SYNDROME CORONAVIRUS 2 (SARS-COV-2) (CORONAVIRUS DISEASE [COVID-19]), AMPLIFIED PROBE TECHNIQUE, MAKING USE OF HIGH THROUGHPUT TECHNOLOGIES AS DESCRIBED BY CMS-2020-01-R: HCPCS | Performed by: EMERGENCY MEDICINE

## 2020-07-08 PROCEDURE — 80048 BASIC METABOLIC PNL TOTAL CA: CPT | Performed by: EMERGENCY MEDICINE

## 2020-07-08 PROCEDURE — C9803 HOPD COVID-19 SPEC COLLECT: HCPCS

## 2020-07-08 PROCEDURE — 84484 ASSAY OF TROPONIN QUANT: CPT | Performed by: EMERGENCY MEDICINE

## 2020-07-08 PROCEDURE — 85610 PROTHROMBIN TIME: CPT | Performed by: PHYSICIAN ASSISTANT

## 2020-07-08 PROCEDURE — 82550 ASSAY OF CK (CPK): CPT | Performed by: EMERGENCY MEDICINE

## 2020-07-08 PROCEDURE — G0378 HOSPITAL OBSERVATION PER HR: HCPCS

## 2020-07-08 PROCEDURE — 99220 ZZC INITIAL OBSERVATION CARE,LEVL III: CPT | Performed by: PHYSICIAN ASSISTANT

## 2020-07-08 PROCEDURE — 99285 EMERGENCY DEPT VISIT HI MDM: CPT | Mod: 25

## 2020-07-08 PROCEDURE — 84484 ASSAY OF TROPONIN QUANT: CPT | Performed by: PHYSICIAN ASSISTANT

## 2020-07-08 PROCEDURE — 71045 X-RAY EXAM CHEST 1 VIEW: CPT

## 2020-07-08 PROCEDURE — 85025 COMPLETE CBC W/AUTO DIFF WBC: CPT | Performed by: EMERGENCY MEDICINE

## 2020-07-08 PROCEDURE — 93005 ELECTROCARDIOGRAM TRACING: CPT

## 2020-07-08 PROCEDURE — 85730 THROMBOPLASTIN TIME PARTIAL: CPT | Performed by: PHYSICIAN ASSISTANT

## 2020-07-08 PROCEDURE — 85379 FIBRIN DEGRADATION QUANT: CPT | Performed by: PHYSICIAN ASSISTANT

## 2020-07-08 PROCEDURE — 25000132 ZZH RX MED GY IP 250 OP 250 PS 637: Performed by: PHYSICIAN ASSISTANT

## 2020-07-08 PROCEDURE — 81001 URINALYSIS AUTO W/SCOPE: CPT | Performed by: EMERGENCY MEDICINE

## 2020-07-08 PROCEDURE — 87086 URINE CULTURE/COLONY COUNT: CPT | Performed by: PHYSICIAN ASSISTANT

## 2020-07-08 PROCEDURE — 36415 COLL VENOUS BLD VENIPUNCTURE: CPT | Performed by: PHYSICIAN ASSISTANT

## 2020-07-08 PROCEDURE — 81025 URINE PREGNANCY TEST: CPT | Performed by: EMERGENCY MEDICINE

## 2020-07-08 PROCEDURE — 86140 C-REACTIVE PROTEIN: CPT | Performed by: EMERGENCY MEDICINE

## 2020-07-08 PROCEDURE — 93005 ELECTROCARDIOGRAM TRACING: CPT | Mod: 76

## 2020-07-08 PROCEDURE — 80076 HEPATIC FUNCTION PANEL: CPT | Performed by: EMERGENCY MEDICINE

## 2020-07-08 RX ORDER — ACETAMINOPHEN 325 MG/1
975 TABLET ORAL ONCE
Status: DISCONTINUED | OUTPATIENT
Start: 2020-07-08 | End: 2020-07-08

## 2020-07-08 RX ORDER — PROCHLORPERAZINE 25 MG
25 SUPPOSITORY, RECTAL RECTAL EVERY 12 HOURS PRN
Status: DISCONTINUED | OUTPATIENT
Start: 2020-07-08 | End: 2020-07-09 | Stop reason: HOSPADM

## 2020-07-08 RX ORDER — ONDANSETRON 4 MG/1
4 TABLET, ORALLY DISINTEGRATING ORAL EVERY 6 HOURS PRN
Status: DISCONTINUED | OUTPATIENT
Start: 2020-07-08 | End: 2020-07-09 | Stop reason: HOSPADM

## 2020-07-08 RX ORDER — TESTOSTERONE CYPIONATE 200 MG/ML
100 INJECTION, SOLUTION INTRAMUSCULAR WEEKLY
COMMUNITY

## 2020-07-08 RX ORDER — AMOXICILLIN 250 MG
1 CAPSULE ORAL 2 TIMES DAILY PRN
Status: DISCONTINUED | OUTPATIENT
Start: 2020-07-08 | End: 2020-07-09 | Stop reason: HOSPADM

## 2020-07-08 RX ORDER — ONDANSETRON 2 MG/ML
4 INJECTION INTRAMUSCULAR; INTRAVENOUS EVERY 6 HOURS PRN
Status: DISCONTINUED | OUTPATIENT
Start: 2020-07-08 | End: 2020-07-09 | Stop reason: HOSPADM

## 2020-07-08 RX ORDER — AMOXICILLIN 250 MG
2 CAPSULE ORAL 2 TIMES DAILY PRN
Status: DISCONTINUED | OUTPATIENT
Start: 2020-07-08 | End: 2020-07-09 | Stop reason: HOSPADM

## 2020-07-08 RX ORDER — LITHIUM CARBONATE 300 MG/1
300 CAPSULE ORAL 3 TIMES DAILY
Status: DISCONTINUED | OUTPATIENT
Start: 2020-07-08 | End: 2020-07-09 | Stop reason: HOSPADM

## 2020-07-08 RX ORDER — ARIPIPRAZOLE 15 MG/1
15 TABLET ORAL DAILY
Status: DISCONTINUED | OUTPATIENT
Start: 2020-07-08 | End: 2020-07-09 | Stop reason: HOSPADM

## 2020-07-08 RX ORDER — BISACODYL 10 MG
10 SUPPOSITORY, RECTAL RECTAL DAILY PRN
Status: DISCONTINUED | OUTPATIENT
Start: 2020-07-08 | End: 2020-07-09 | Stop reason: HOSPADM

## 2020-07-08 RX ORDER — ACETAMINOPHEN 650 MG/1
650 SUPPOSITORY RECTAL EVERY 4 HOURS PRN
Status: DISCONTINUED | OUTPATIENT
Start: 2020-07-08 | End: 2020-07-09 | Stop reason: HOSPADM

## 2020-07-08 RX ORDER — PROCHLORPERAZINE MALEATE 10 MG
10 TABLET ORAL EVERY 6 HOURS PRN
Status: DISCONTINUED | OUTPATIENT
Start: 2020-07-08 | End: 2020-07-09 | Stop reason: HOSPADM

## 2020-07-08 RX ORDER — NALOXONE HYDROCHLORIDE 0.4 MG/ML
.1-.4 INJECTION, SOLUTION INTRAMUSCULAR; INTRAVENOUS; SUBCUTANEOUS
Status: DISCONTINUED | OUTPATIENT
Start: 2020-07-08 | End: 2020-07-09 | Stop reason: HOSPADM

## 2020-07-08 RX ORDER — ACETAMINOPHEN 325 MG/1
650 TABLET ORAL EVERY 4 HOURS PRN
Status: DISCONTINUED | OUTPATIENT
Start: 2020-07-08 | End: 2020-07-09 | Stop reason: HOSPADM

## 2020-07-08 RX ORDER — ARIPIPRAZOLE 15 MG/1
15 TABLET ORAL DAILY
COMMUNITY

## 2020-07-08 RX ADMIN — ACETAMINOPHEN 650 MG: 325 TABLET, FILM COATED ORAL at 13:15

## 2020-07-08 RX ADMIN — ARIPIPRAZOLE 15 MG: 15 TABLET ORAL at 14:34

## 2020-07-08 RX ADMIN — LITHIUM CARBONATE 300 MG: 300 CAPSULE, GELATIN COATED ORAL at 20:07

## 2020-07-08 RX ADMIN — LITHIUM CARBONATE 300 MG: 300 CAPSULE, GELATIN COATED ORAL at 14:34

## 2020-07-08 ASSESSMENT — MIFFLIN-ST. JEOR: SCORE: 1865.74

## 2020-07-08 NOTE — PHARMACY-ADMISSION MEDICATION HISTORY
"Pharmacy Medication History  Admission medication history interview status for the 7/8/2020  admission is complete. See EPIC admission navigator for prior to admission medications     Medication history sources: Patient  Medication history source reliability: Good  Adherence assessment: Good    Significant changes made to the medication list:  Changed the Testosterone dose  Added abilify dose  Added iron liquid  Removed multivit  Added metformin      Additional medication history information:   none    Medication reconciliation completed by provider prior to medication history? No    Time spent in this activity: 15 min      Prior to Admission medications    Medication Sig Last Dose Taking? Auth Provider   ARIPiprazole (ABILIFY) 15 MG tablet Take 15 mg by mouth daily 7/7/2020 at Unknown time Yes Unknown, Entered By History   ferrous sulfate 300 (60 Fe) MG/5ML syrup Take 300 mg by mouth 2 times daily 7/7/2020 at Unknown time Yes Unknown, Entered By History   lithium 300 MG tablet Take 300 mg by mouth 3 times daily 7/7/2020 at Unknown time Yes Reported, Patient   metFORMIN (GLUCOPHAGE) 500 MG tablet Take 500 mg by mouth daily (with breakfast) 7/7/2020 at Unknown time Yes Unknown, Entered By History   testosterone cypionate (DEPOTESTOSTERONE) 200 MG/ML injection Inject 100 mg into the muscle once a week Tuesdays 6/30/2020 Yes Unknown, Entered By History   needle, disp, 18G X 1\" MISC To use to draw up IM medication weekly   Alireza Juarez MD   Needle, Disp, 25G X 1\" MISC To use with weekly IM injection   Alireza Juarez MD   syringe, disposable, 1 ML MISC To use with IM weekly injections   Alireza Juarez MD       "
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
## 2020-07-08 NOTE — PLAN OF CARE
RECEIVING UNIT ED HANDOFF REVIEW    ED Nurse Handoff Report was reviewed by: Lien Barbosa RN on July 8, 2020 at 12:12 PM

## 2020-07-08 NOTE — PROGRESS NOTES
"Date: 2020 19:44:16  Clinician: Amor Ritchie  Clinician NPI: [id number]  Patient: Swetha Latham  Patient : 1990  Patient Address: [address] [place] [address]  Patient Phone: [phone]  Visit Protocol: URI  Patient Summary:  Swetha is a 30 year old ( : 1990 ) male who initiated a Visit for COVID-19 (Coronavirus) evaluation and screening. When asked the question \"Please sign me up to receive news, health information and promotions. \", Swetha responded \"No\".    Swetha states his symptoms started 1-2 days ago.   His symptoms consist of nausea, tooth pain, vomiting, malaise, ear pain, a headache, a sore throat, myalgia, facial pain or pressure, and a cough. He is experiencing mild difficulty breathing with activities but can speak normally in full sentences. Swetha also feels feverish.   Symptom details     Cough: Swetha coughs a few times an hour and his cough is more bothersome at night. Phlegm does not come into his throat when he coughs. He believes his cough is caused by post-nasal drip.     Sore throat: Swetha reports having moderate throat pain (4-6 on a 10 point pain scale), does not have exudate on his tonsils, and can swallow liquids. The lymph nodes in his neck are not enlarged. A rash has not appeared on the skin since the sore throat started.     Temperature: His current temperature is 100.8 degrees Fahrenheit. Swetha is not sure how long he has had a temperature over 100 degrees Fahrenheit.     Facial pain or pressure: The facial pain or pressure feels worse when bending over or leaning forward.     Headache: He states the headache is severe (7-9 on a 10 point pain scale).     Tooth pain: The tooth pain is not caused by a cavity, recent dental work, or other mouth problems.      Swetha denies having wheezing, ageusia, diarrhea, rhinitis, chills, enlarged lymph nodes, anosmia, and nasal congestion. He also denies having recent facial or sinus surgery in the past 60 days, taking " antibiotic medication in the past month, and having a sinus infection within the past year.   Precipitating events  Within the past week, Swetha has not been exposed to someone with strep throat. He has not recently been exposed to someone with influenza. Swetha has not been in close contact with any high risk individuals.   Pertinent COVID-19 (Coronavirus) information  In the past 14 days, Swetha has not worked in a congregate living setting.   He does not work or volunteer as healthcare worker or a  and does not work or volunteer in a healthcare facility.   Swetha also has not lived in a congregate living setting in the past 14 days. He does not live with a healthcare worker.   Swehta has had a close contact with a laboratory-confirmed COVID-19 patient within 14 days of symptom onset. Additional information about contact with COVID-19 (Coronavirus) patient as reported by the patient (free text): My Sister and brother in law both have confirmed covid 19 , and I stay with them.   Pertinent medical history  Swetha needs a return to work/school note.   Weight: 260 lbs   Swetha does not smoke or use smokeless tobacco.   Additional information as reported by the patient (free text): Bipolar,  borderline personality disorder,  transgender   Weight: 260 lbs    MEDICATIONS: lithium carbonate oral, Depo-Testosterone intramuscular, Abilify oral, metformin oral, anastrozole oral, ALLERGIES: metformin, Depo-Testosterone, lithium, Abilify  Clinician Response:  Dear Swetha,   Your symptoms show that you may have coronavirus (COVID-19). This illness can cause fever, cough and trouble breathing. Many people get a mild case and get better on their own. Some people can get very sick.  What should I do?  We would like to test you for this virus.   1. Please call [phone] to schedule your visit. Explain that you were referred by [facility] to have a COVID-19 test. Be ready to share your [facility] visit ID number.  The following  "will serve as your written order for this COVID Test, ordered by me, for the indication of suspected COVID [Z20.828]: The test will be ordered in [employer], our electronic health record, after you are scheduled. It will show as ordered and authorized by David Bergeron MD.  Order: COVID-19 (Coronavirus) PCR for [facility]MPTOMATIC testing from [facility].      2. When it's time for your COVID test:  Stay at least 6 feet away from others. (If someone will drive you to your test, stay in the backseat, as far away from the  as you can.)   Cover your mouth and nose with a mask, tissue or washcloth.  Go straight to the testing site. Don't make any stops on the way there or back.      3.Starting now: Stay home and away from others (self-isolate) until:   You've had no fever---and no medicine that reduces fever---for 3 full days (72 hours). And...   Your other symptoms have gotten better. For example, your cough or breathing has improved. And...   At least 10 days have passed since your symptoms started.       During this time, don't leave the house except for testing or medical care.   Stay in your own room, even for meals. Use your own bathroom if you can.   Stay away from others in your home. No hugging, kissing or shaking hands. No visitors.  Don't go to work, school or anywhere else.    Clean \"high touch\" surfaces often (doorknobs, counters, handles, etc.). Use a household cleaning spray or wipes. You'll find a full list of  on the EPA website: www.epa.gov/pesticide-registration/list-n-disinfectants-use-against-sars-cov-2.   Cover your mouth and nose with a mask, tissue or washcloth to avoid spreading germs.  Wash your hands and face often. Use soap and water.  Caregivers in these groups are at risk for severe illness due to COVID-19:  o People 65 years and older  o People who live in a nursing home or long-term care facility  o People with chronic disease (lung, heart, cancer, diabetes, kidney, liver, immunologic)  o People " who have a weakened immune system, including those who:   Are in cancer treatment  Take medicine that weakens the immune system, such as corticosteroids  Had a bone marrow or organ transplant  Have an immune deficiency  Have poorly controlled HIV or AIDS  Are obese (body mass index of 40 or higher)  Smoke regularly   o Caregivers should wear gloves while washing dishes, handling laundry and cleaning bedrooms and bathrooms.  o Use caution when washing and drying laundry: Don't shake dirty laundry, and use the warmest water setting that you can.  o For more tips, go to www.cdc.gov/coronavirus/2019-ncov/downloads/10Things.pdf.    4.Sign up for [employer]. We know it's scary to hear that you might have COVID-19. We want to track your symptoms to make sure you're okay over the next 2 weeks. Please look for an email from [employer]---this is a free, online program that we'll use to keep in touch. To sign up, follow the link in the email. Learn more at [email or web address]  How can I take care of myself?   Get lots of rest. Drink extra fluids (unless a doctor has told you not to).   Take Tylenol (acetaminophen) for fever or pain. If you have liver or kidney problems, ask your family doctor if it's okay to take Tylenol.   Adults can take either:    650 mg (two 325 mg pills) every 4 to 6 hours, or...   1,000 mg (two 500 mg pills) every 8 hours as needed.    Note: Don't take more than 3,000 mg in one day. Acetaminophen is found in many medicines (both prescribed and over-the-counter medicines). Read all labels to be sure you don't take too much.   For children, check the Tylenol bottle for the right dose. The dose is based on the child's age or weight.    If you have other health problems (like cancer, heart failure, an organ transplant or severe kidney disease): Call your specialty clinic if you don't feel better in the next 2 days.       Know when to call 911. Emergency warning signs include:    Trouble  breathing or shortness of breath Pain or pressure in the chest that doesn't go away Feeling confused like you haven't felt before, or not being able to wake up Bluish-colored lips or face.  Where can I get more information?   [facility] -- About COVID-19: [email or web address]   CDC -- What to Do If You're Sick: www.cdc.gov/coronavirus/2019-ncov/about/steps-when-sick.html   CDC -- Ending Home Isolation: www.cdc.gov/coronavirus/2019-ncov/hcp/disposition-in-home-patients.html   CDC -- Caring for Someone: www.cdc.gov/coronavirus/2019-ncov/if-you-are-sick/care-for-someone.html   [facility] -- Interim Guidance for Hospital Discharge to Home: www.[facility].mn.us/diseases/coronavirus/hcp/hospdischarge.pdf   [facility] clinical trials (COVID-19 research studies): clinicalaffairs.[facility]-clinical-trials    Below are the COVID-19 hotlines at the [facility] ([facility]). Interpreters are available.    For health questions: Call 891-760-6348 or 1-463.313.3321 (7 a.m. to 7 p.m.) For questions about schools and childcare: Call 702-719-9480 or 1-445.134.7104 (7 a.m. to 7 p.m.)    Diagnosis: Cough  Diagnosis ICD: R05

## 2020-07-08 NOTE — PLAN OF CARE
[id number]: A&Ox4. VSS on RA. Up independently. Regular diet. Tyelnol prn. Generalized body aches. IV SL. Trops negative x3. Echo yet to be done. Awaiting COVID results.

## 2020-07-08 NOTE — PLAN OF CARE
A&Ox4, VSS on RA. C/o 7/10 headache pain managed by PRN tylenol. Up ad armani. Reg diet, voiding adequately. IV SL. Trops neg x2. Plan for Echo. Awaiting COVID results.

## 2020-07-08 NOTE — ED NOTES
[facility]  ED Nurse Handoff Report    ED Chief complaint: Fever (Fever, cough, SOB, headaches. Pt's family has tested positive for covid.)      ED Diagnosis:   Final diagnoses:   Myalgia   Chest tightness   Chills       Code Status: Full Code    Allergies: No Known Allergies    Patient Story: Pt has family members pos for covid, has fevers, headaches and shortness of breath with a cough this week.  Focused Assessment:  Pt. A/O can make needs known. Denies chest pain at this time, has infrequent cough.    Treatments and/or interventions provided: Labs, Xray  Patient's response to treatments and/or interventions: Well    To be done/followed up on inpatient unit:  Monitor    Does this patient have any cognitive concerns?: None    Activity level - Baseline/Home:  Independent  Activity Level - Current:   Independent    Patient's Preferred language: English   Needed?: No    Isolation: None  Infection: R/O covid-19    Bariatric?: No    Vital Signs:   Vitals:    07/08/20 0930 07/08/20 1000 07/08/20 1050 07/08/20 1110   BP:  129/85     Pulse:  83     Temp:       TempSrc:       SpO2: 98% 98% 96% 100%   Weight:       Height:           Cardiac Rhythm:     Was the PSS-3 completed:   Yes  What interventions are required if any?               Family Comments: Pt has family members tested positive for covid-19  OBS brochure/video discussed/provided to patient/family: N/A              Name of person given brochure if not patient: NA              Relationship to patient: NA    For the majority of the shift this patient's behavior was Green.   Behavioral interventions performed were None.    ED NURSE PHONE NUMBER: RN [phone]

## 2020-07-08 NOTE — PLAN OF CARE
"PRIMARY DIAGNOSIS: \"GENERIC\" NURSING  OUTPATIENT/OBSERVATION GOALS TO BE MET BEFORE DISCHARGE:  1. ADLs back to baseline: Yes    2. Activity and level of assistance: Ambulating independently.    3. Pain status: Improved-controlled with oral pain medications.    4. Return to near baseline physical activity: Yes     Discharge Planner Nurse   Safe discharge environment identified: No  Barriers to discharge: Yes       Entered by: Lien Barbosa 07/08/2020 2:00 PM     Please review provider order for any additional goals.   Nurse to notify provider when observation goals have been met and patient is ready for discharge.  "

## 2020-07-08 NOTE — PROGRESS NOTES
OUTPATIENT/OBSERVATION GOALS TO BE MET BEFORE DISCHARGE:  1. ADLs back to baseline: Yes     2. Activity and level of assistance: Ambulating independently.     3. Pain status: Improved-controlled with oral pain medications.     4. Return to near baseline physical activity: Yes

## 2020-07-08 NOTE — PROGRESS NOTES
Hospitalist admission note dictated. Confirmation #: [id number].    JoAnna Barthell, PA-C  7/8/2020   2:29 PM

## 2020-07-08 NOTE — H&P
Admitted:     07/08/2020      HOSPITALIST ADMISSION      DATE OF ADMISSION:  07/08/2020      PRIMARY CARE PROVIDER:  [facility].      CHIEF COMPLAINT:  Fever.      History is provided by the patient.      HISTORY OF PRESENT ILLNESS:  Pan Albarran is a 30-year-old transmale with bipolar disorder, history of asthma, and borderline personality disorder who presented for COVID-19 testing.  He reports that approximately 6 days ago he developed vomiting, chills, body aches, dry cough, mild dyspnea on exertion, and bilateral chest discomfort. The vomiting has since resolved. His max temperature has been 100.8 degrees Fahrenheit.  He has been taking Tylenol with some improvement.  He lives with his twin sister and brother-in-law who both recently tested positive for COVID-19.      In the Emergency Department, the patient was evaluated by Dr. Massiel Brown.  Vitals there showed the patient with an elevated temperature of 99.3, normal heart rate, overall normal blood pressure and no hypoxia.  Basic labs were obtained and revealed a creatinine of 1.09 with GFR of 67 as well as leukopenia of 3.9.  An EKG was obtained and showed normal sinus rhythm without acute ischemic changes.  Initial troponin returned detectable but within reference range at 0.036, and serial evaluation  2 hours later was slightly higher at 0.042.  Chest x-ray does not show any acute cardiopulmonary disease.     Chest discomfort described as an aching sensation with tightness that sometimes is felt in left arm.  It is waxing and waning in severity and has been intermittent over the last several days, but more persistent over the course of today.  It is not worse with exertion and, in fact, he notices it more when he is still.  Slightly worse with deep breathing.  The patient has no known history of hypertension, high cholesterol or diabetes.  He does take metformin, it sounds, as part of gender dysphoria treatment.        PAST  MEDICAL HISTORY:   1.  Bipolar 2 disorder.   2.  Borderline personality disorder.   3.  Gender dysphoria.   4.  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   5.  Obesity.      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         History of childhood asthma.        FAMILY HISTORY:  Grandfather with initial MI at age 65 and again in his early 70s.      SOCIAL HISTORY:  The patient does not smoke.  He occasionally drinks alcohol, about every 6 months.  He does not use illicit drugs.        PRIOR TO ADMISSION MEDICATIONS:   1.  Abilify 50 mg daily.   2.  Ferrous sulfate 300 mg per 5 mL syrup 300 mg b.i.d.   3.  Lithium 300 mg t.i.d.   4.  Metformin 500 mg daily with breakfast.   5.  Depo-testosterone 200 mg per mL injection, inject 100 mg intramuscularly once a week on Tuesdays.      ALLERGIES:  NO KNOWN DRUG ALLERGIES.      REVIEW OF SYSTEMS:  A complete review of systems was performed and is negative except for that noted in the HPI.      PHYSICAL EXAMINATION:   Formal bedside exam was not performed in effort to conserve PPE in setting of COVID-19 pandemic and high suspicion for COVID-positive patient. Please refer to ED provider note for more detailed exam -- no murmurs, rubs or adventitious breath sounds on cardiac or respiratory ascultation.  VITAL SIGNS:  Blood pressure 135/81, heart rate 80, temperature 97.3, respirations 16, oxygen saturation 99%.   GENERAL:  Pleasant transmale who appears stated age and looks comfortable sitting upright.     SKIN:  Appears mildly diaphoretic.  No rash or lesions on exposed skin.   HEENT:  Appears normocephalic and atraumatic.   CHEST:  No increased work of breathing on room air.   ABDOMEN:  Obese body habitus.   MUSCULOSKELETAL:  Moves all 4 extremities.   NEUROLOGIC:  Cranial nerves II-XII are grossly intact.      LABORATORY DATA:     Sodium 141, potassium 4.1, BUN 6, creatinine 1.09, GFR 78, glucose 141.   WBC 3.9, hemoglobin 13.1, platelets 184.    Albumin 3.3, total protein 7.5, total bilirubin 0.4, LFTs within reference range.   Total .   CRP 9.0.   Urine pregnancy test is negative.   Urinalysis with 5 ketones, 10 protein, moderate leukocyte esterase, few bacteria, and mucus present.   Troponin I is 0 360 and 0.042.      IMAGING:  EKG shows normal sinus rhythm.      Chest x-ray does not show acute cardiopulmonary disease.      Both above imaging studies personally reviewed.      ASSESSMENT AND PLAN:  Marion Albarran is a 30-year-old transmale with history of childhood asthma, borderline personality disorder, and bipolar 2 disorder with recent COVID-19 exposure, who presents with fever and chest pain, who was found to have detectable serial troponin.  He is admitted under observation status for serial troponin monitoring and echocardiogram.      Chest pain.  Suspect related to a viral illness, but cannot rule out pericarditis or myocarditis at this point.  EKG shows normal sinus rhythm and no ST changes.  Serial troponin detectable, but within reference range, measuring 0.036 followed by 0.042. CXR negative.  Hemodynamically stable, and vitals are within normal limits.   -- Monitor serial troponin.  Will trend for third value.   -- Obtain echocardiogram.   -- Monitor on telemetry.    Suspected COVID-19 viral syndrome.     Fever, chills, myalgias, cough, mild loya, chest pain/tightness. Known exposure to sister and brother-in-law with whom he lives and tested positive in recent days.  As above, vital signs are stable.  No hypoxia, tachycardia, hypotension. CXR does not suggest secondary pneumonia.   - Patient is high suspicion. If negative and vitals remain normal and stable for hopsital discharge do not think repeat testing indicated and would treat as COVID19 positive. If remains in house, would repeat testing in 3 days.  -- D-dimer is pending.   -- Instructed patient that regardless of COVID-19 result, recommend he quarantine for 14 days in  anticipation of hospital discharge on 2020.     Abnormal urine analysis.  Moderate leukocyte esterase, few bacteria, and mucous present in urine; no nitrite and only 3 WBC. Vaguely endorses urinary freqency x 2 days and c/o bilat back pain in shoulder blades down to low back, R>L. Suspect fever/chills/myalgias from above.  - Add on urine culture.    Bipolar 2 disorder.   Borderline personality disorder.   -- Continues on PTA lithium and Abilify.     Gender dysphoria.  The patient is a transmale on metformin and testosterone therapy.  It sounds like he also takes androgen medication, though this is not on PTA admission medication list.  He is okay with missing these doses for 1-2 days while under observation status.     Deep venous thrombosis prophylaxis:  Short stay is anticipated, and the patient is ambulatory.      CODE STATUS:  Full code, confirmed at time of admission.      DISPOSITION:  Anticipate discharge 2020, pending results of a troponin and echocardiogram.      This patient was discussed with Dr. Diony Love of the Hospitalist Service who is in agreement with my assessment and plan of care as outlined above.         JOANNA ULMEN BARTHELL, PA-C             D: 2020   T: 2020   MT:       Name:     JOSEF BUCKLEY   MRN:      [id number]        Account:      [id number]   :      1990        Admitted:     2020                   Document: [id number]       cc: [facility]

## 2020-07-09 ENCOUNTER — APPOINTMENT (OUTPATIENT)
Dept: CARDIOLOGY | Facility: CLINIC | Age: 30
End: 2020-07-09
Attending: PHYSICIAN ASSISTANT
Payer: OTHER GOVERNMENT

## 2020-07-09 VITALS
OXYGEN SATURATION: 100 % | DIASTOLIC BLOOD PRESSURE: 67 MMHG | TEMPERATURE: 99.1 F | WEIGHT: 250 LBS | HEIGHT: 66 IN | HEART RATE: 59 BPM | SYSTOLIC BLOOD PRESSURE: 125 MMHG | BODY MASS INDEX: 40.18 KG/M2 | RESPIRATION RATE: 16 BRPM

## 2020-07-09 LAB
ANION GAP SERPL CALCULATED.3IONS-SCNC: 3 MMOL/L (ref 3–14)
BACTERIA SPEC CULT: NORMAL
BUN SERPL-MCNC: 6 MG/DL (ref 7–30)
CALCIUM SERPL-MCNC: 8.5 MG/DL (ref 8.5–10.1)
CHLORIDE SERPL-SCNC: 111 MMOL/L (ref 94–109)
CO2 SERPL-SCNC: 26 MMOL/L (ref 20–32)
CREAT SERPL-MCNC: 1.01 MG/DL (ref 0.52–1.04)
GFR SERPL CREATININE-BSD FRML MDRD: 74 ML/MIN/{1.73_M2}
GLUCOSE SERPL-MCNC: 94 MG/DL (ref 70–99)
Lab: NORMAL
POTASSIUM SERPL-SCNC: 4.4 MMOL/L (ref 3.4–5.3)
SARS-COV-2 RNA SPEC QL NAA+PROBE: ABNORMAL
SODIUM SERPL-SCNC: 140 MMOL/L (ref 133–144)
SPECIMEN SOURCE: ABNORMAL
SPECIMEN SOURCE: NORMAL

## 2020-07-09 PROCEDURE — 93308 TTE F-UP OR LMTD: CPT

## 2020-07-09 PROCEDURE — 93321 DOPPLER ECHO F-UP/LMTD STD: CPT | Mod: 26 | Performed by: INTERNAL MEDICINE

## 2020-07-09 PROCEDURE — 99207 ZZC CDG-CODE CATEGORY CHANGED: CPT | Performed by: STUDENT IN AN ORGANIZED HEALTH CARE EDUCATION/TRAINING PROGRAM

## 2020-07-09 PROCEDURE — 93308 TTE F-UP OR LMTD: CPT | Mod: 26 | Performed by: INTERNAL MEDICINE

## 2020-07-09 PROCEDURE — 93325 DOPPLER ECHO COLOR FLOW MAPG: CPT | Mod: 26 | Performed by: INTERNAL MEDICINE

## 2020-07-09 PROCEDURE — 80048 BASIC METABOLIC PNL TOTAL CA: CPT | Performed by: PHYSICIAN ASSISTANT

## 2020-07-09 PROCEDURE — G0378 HOSPITAL OBSERVATION PER HR: HCPCS

## 2020-07-09 PROCEDURE — 25000132 ZZH RX MED GY IP 250 OP 250 PS 637: Performed by: PHYSICIAN ASSISTANT

## 2020-07-09 PROCEDURE — 99217 ZZC OBSERVATION CARE DISCHARGE: CPT | Performed by: STUDENT IN AN ORGANIZED HEALTH CARE EDUCATION/TRAINING PROGRAM

## 2020-07-09 RX ADMIN — LITHIUM CARBONATE 300 MG: 300 CAPSULE, GELATIN COATED ORAL at 08:09

## 2020-07-09 RX ADMIN — ARIPIPRAZOLE 15 MG: 15 TABLET ORAL at 08:08

## 2020-07-09 RX ADMIN — LITHIUM CARBONATE 300 MG: 300 CAPSULE, GELATIN COATED ORAL at 13:37

## 2020-07-09 RX ADMIN — ACETAMINOPHEN 650 MG: 325 TABLET, FILM COATED ORAL at 01:25

## 2020-07-09 NOTE — PROGRESS NOTES
OUTPATIENT/OBSERVATION GOALS TO BE MET BEFORE DISCHARGE:  1. ADLs back to baseline: Yes     2. Activity and level of assistance: Ambulating independently.     3. Pain status: Improved-controlled with oral pain medications.     4. Return to near baseline physical activity: Yes  5. Covid test waiting results

## 2020-07-09 NOTE — PLAN OF CARE
OUTPATIENT/OBSERVATION GOALS TO BE MET BEFORE DISCHARGE:  ADLs back to baseline: Yes     Activity and level of assistance: Ambulating independently.     Pain status: Improved-controlled with oral pain medications.     Return to near baseline physical activity: Yes  Covid test waiting results    Patient complains of HA and chest pain while taking deep breath.  Ind ambulation in the room. Tolerating regular diet

## 2020-07-09 NOTE — PROVIDER NOTIFICATION
MD Notification    Notified Person: MD    Notified Person Name: Dr Love    Notification Date/Time: 7/9/2020 @1025    Notification Interaction: web paged    Purpose of Notification: positive for COVID 19    Orders Received: Awaiting return call    Comments:

## 2020-07-09 NOTE — DISCHARGE SUMMARY
[facility]  Hospitalist Discharge Summary      Date of Admission:  7/8/2020  Date of Discharge:  7/9/2020  Discharging Provider: Diony Love MD      Discharge Diagnoses     COVID-19  Fever    Follow-ups Needed After Discharge   Follow-up Appointments     Follow-up and recommended labs and tests       Follow up with primary care provider, [facility], within   7 days for hospital follow- up.  The following labs/tests are recommended:   None.           Unresulted Labs Ordered in the Past 30 Days of this Admission     No orders found for last 31 day(s).        Discharge Disposition      Discharged to home  Condition at discharge: Stable    Hospital Course      Marion Albarran is a 30-year-old transmale with history of childhood asthma, borderline personality disorder, and bipolar 2 disorder with recent COVID-19 exposure, who presents with fever and chest pain, who was found to have detectable serial troponin.  He is admitted under observation status for serial troponin monitoring and echocardiogram.      Chest pain.  Suspect related to a viral illness, but cannot rule out pericarditis or myocarditis at this point.  EKG shows normal sinus rhythm and no ST changes.  Serial troponin detectable, but within reference range, measuring 0.036 followed by 0.042. CXR negative.  Hemodynamically stable, and vitals are within normal limits.   -- ECHO with preserved EF     COVID-19 POSITIVE  Fever, chills, myalgias, cough, mild loya, chest pain/tightness. Known exposure to sister and brother-in-law with whom he lives and tested positive in recent days.  As above, vital signs are stable.  No hypoxia, tachycardia, hypotension. CXR does not suggest secondary pneumonia.   -- D-dimer is wnl.   -- Instructed patient that he should quarantine for 14 days in anticipation of hospital discharge on 07/09/2020.      Abnormal urine analysis.  Moderate leukocyte esterase, few bacteria, and mucous present  in urine; no nitrite and only 3 WBC. Vaguely endorses urinary freqency x 2 days and c/o bilat back pain in shoulder blades down to low back, R>L. Suspect fever/chills/myalgias from above.  - Add on urine culture.     Bipolar 2 disorder.   Borderline personality disorder.   -- Continues on PTA lithium and Abilify.      Gender dysphoria.  The patient is a transmale on metformin and testosterone therapy.  It sounds like he also takes androgen medication, though this is not on PTA admission medication list.  He is okay with missing these doses for 1-2 days while under observation status.        Consultations This Hospital Stay   None    Code Status   Full Code    Time Spent on this Encounter   I, Diony Love MD, personally saw the patient today and spent greater than 30 minutes discharging this patient.       Diony Love MD  [facility]  ______________________________________________________________________    Physical Exam   Vital Signs: Temp: 99.1  F (37.3  C) Temp src: Oral BP: 125/67 Pulse: 59   Resp: 16 SpO2: 100 % O2 Device: None (Room air)    Weight: 250 lbs 0 oz    Primary Care Physician   [facility]    Discharge Orders      Reason for your hospital stay    You had chest pain and fevers, found to have COVID-19 positive test. Your heart tests were negative for heart disease.     Follow-up and recommended labs and tests     Follow up with primary care provider, [facility], within 7 days for hospital follow- up.  The following labs/tests are recommended: None.     Activity    Your activity upon discharge: activity as tolerated     Diet    Follow this diet upon discharge: Orders Placed This Encounter      Regular Diet Adult       Significant Results and Procedures   Most Recent 3 CBC's:  Recent Labs   Lab Test 07/08/20  0757   WBC 3.9*   HGB 13.1   MCV 86        Most Recent 3 BMP's:  Recent Labs   Lab Test 07/09/20  0610 07/08/20  0757 10/17/17  0929     141  --    POTASSIUM 4.4 4.1  --    CHLORIDE 111* 109  --    CO2 26 29  --    BUN 6* 6*  --    CR 1.01 1.09*  --    ANIONGAP 3 3  --    LYUBOV 8.5 8.7  --    GLC 94 141* 90     Most Recent 2 LFT's:  Recent Labs   Lab Test 20  1002 10/17/17  0929   AST 25 44   ALT 36 45   ALKPHOS 51  --    BILITOTAL 0.4  --      Most Recent 3 INR's:  Recent Labs   Lab Test 20  1542   INR 1.13   ,   Results for orders placed or performed during the hospital encounter of 20   XR Chest Port 1 View    Narrative    CHEST PORTABLE ONE VIEW 2020 11:33 AM     HISTORY: Chest pain.    COMPARISON: None.      Impression    IMPRESSION: No active cardiopulmonary disease.    MARY MCFARLAND MD   Echo Limited    Narrative    [id number]  [id number]  [id number]  [id number]^BARTHELL^[facility]  [facility]  [address] [place] [id number]        Name: JOSEF BUCKLEY  MRN: [id number]  : 1990  Study Date: 2020 12:33 PM  Age: 30 yrs  Gender: Female  Patient Location: [facility]  Reason For Study: Chest Pain, Fever  Ordering Physician: BARTHELL, JOANNA  Performed By: Naif Skelton     BSA: 2.2 m2  Height: 66 in  Weight: 250 lb  HR: 74  BP: 132/92 mmHg  _____________________________________________________________________________  __        Procedure  Limited Portable Echo Adult.  _____________________________________________________________________________  __        Interpretation Summary     1. The left ventricle is normal in structure, function and size. The visual  ejection fraction is estimated at 60%.  2. The right ventricle is normal in structure, function and size.  3. No valve disease.     No previous echo for comparison.  _____________________________________________________________________________  __        Left Ventricle  The left ventricle is normal in structure, function and size. There is normal  left ventricular wall thickness. The visual  ejection fraction is estimated at  60%. Left ventricular diastolic function is normal. Normal left ventricular  wall motion.     Right Ventricle  The right ventricle is normal in structure, function and size.     Atria  Normal left atrial size. Right atrial size is normal. There is no atrial shunt  seen.     Mitral Valve  The mitral valve is normal in structure and function.        Tricuspid Valve  The tricuspid valve is normal in structure and function.     Aortic Valve  The aortic valve is normal in structure and function.     Pulmonic Valve  The pulmonic valve is normal in structure and function.     Vessels  Normal ascending, transverse (arch), and descending aorta. The inferior vena  cava was normal in size with preserved respiratory variability.     Pericardium  There is no pericardial effusion.        Rhythm  Sinus rhythm was noted.  _____________________________________________________________________________  __  MMode/2D Measurements & Calculations     IVSd: 1.1 cm  LVIDd: 5.5 cm  LVIDs: 3.3 cm  LVPWd: 0.92 cm  FS: 39.4 %  LV mass(C)d: 213.0 grams  LV mass(C)dI: 96.9 grams/m2  asc Aorta Diam: 2.3 cm  RWT: 0.34        Doppler Measurements & Calculations  TR max morenita: 266.0 cm/sec  TR max P.3 mmHg           _____________________________________________________________________________  __           Report approved by: Aries Alfred 2020 02:47 PM            Discharge Medications   Current Discharge Medication List      CONTINUE these medications which have NOT CHANGED    Details   ARIPiprazole (ABILIFY) 15 MG tablet Take 15 mg by mouth daily      ferrous sulfate 300 (60 Fe) MG/5ML syrup Take 300 mg by mouth 2 times daily      lithium 300 MG tablet Take 300 mg by mouth 3 times daily      metFORMIN (GLUCOPHAGE) 500 MG tablet Take 500 mg by mouth daily (with breakfast)      testosterone cypionate (DEPOTESTOSTERONE) 200 MG/ML injection Inject 100 mg into the muscle once a week      "  needle, disp, 18G X 1\" MISC To use to draw up IM medication weekly  Qty: 25 each, Refills: 3    Associated Diagnoses: Gender dysphoria in adolescent and adult      Needle, Disp, 25G X 1\" MISC To use with weekly IM injection  Qty: 25 each, Refills: 3    Associated Diagnoses: Gender dysphoria in adolescent and adult      syringe, disposable, 1 ML MISC To use with IM weekly injections  Qty: 25 each, Refills: 3    Associated Diagnoses: Gender dysphoria in adolescent and adult           Allergies   No Known Allergies  "

## 2020-07-09 NOTE — PROGRESS NOTES
Observation goals  PRIOR TO DISCHARGE      Comments: -diagnostic tests and consults completed and resulted: Echo pending  -vital signs normal or at patient baseline: Met  -myocarditis eval completed: Met.  Echo pending

## 2020-07-09 NOTE — PLAN OF CARE
A&Ox4. VSS on RA. L/s clear. Mild left upper shoulder pain 2/10, relieved with heat packs. Denies SOB or chest pain. Tele NSR. Tolerating reg diet. Up independently in room. Echo completed.     Pt discharged to home at 1700, ride given by Pt's brother. Discharge instructions reviewed with Pt, questions answered. Pt aware of quarantine for 14 days after discharge from hospital. Sent all personal belonging with Pt.

## 2020-07-09 NOTE — PLAN OF CARE
OUTPATIENT/OBSERVATION GOALS TO BE MET BEFORE DISCHARGE:  ADLs back to baseline: Yes     Activity and level of assistance: Ambulating independently.     Pain status: Improved-controlled with oral pain medications.     Return to near baseline physical activity: Yes  Covid test waiting results   Patient is independent  in room. Denies CP but has pain with a deep breath. He has pain with taking a deep breath. Reposition helps with the back pain. Continuous oxygen level 99% denies SOB.

## 2020-10-31 ENCOUNTER — HOSPITAL ENCOUNTER (EMERGENCY)
Facility: CLINIC | Age: 30
Discharge: HOME | End: 2020-10-31
Attending: EMERGENCY MEDICINE | Admitting: EMERGENCY MEDICINE
Payer: OTHER GOVERNMENT

## 2020-10-31 VITALS
TEMPERATURE: 99.9 F | SYSTOLIC BLOOD PRESSURE: 150 MMHG | HEART RATE: 85 BPM | DIASTOLIC BLOOD PRESSURE: 93 MMHG | RESPIRATION RATE: 24 BRPM | OXYGEN SATURATION: 100 %

## 2020-10-31 DIAGNOSIS — J02.0 ACUTE STREPTOCOCCAL PHARYNGITIS: ICD-10-CM

## 2020-10-31 LAB
DEPRECATED S PYO AG THROAT QL EIA: POSITIVE
SPECIMEN SOURCE: ABNORMAL

## 2020-10-31 PROCEDURE — 99283 EMERGENCY DEPT VISIT LOW MDM: CPT

## 2020-10-31 PROCEDURE — 87880 STREP A ASSAY W/OPTIC: CPT | Performed by: EMERGENCY MEDICINE

## 2020-10-31 PROCEDURE — 250N000009 HC RX 250: Performed by: EMERGENCY MEDICINE

## 2020-10-31 PROCEDURE — C9803 HOPD COVID-19 SPEC COLLECT: HCPCS

## 2020-10-31 PROCEDURE — 250N000013 HC RX MED GY IP 250 OP 250 PS 637: Performed by: EMERGENCY MEDICINE

## 2020-10-31 PROCEDURE — U0003 INFECTIOUS AGENT DETECTION BY NUCLEIC ACID (DNA OR RNA); SEVERE ACUTE RESPIRATORY SYNDROME CORONAVIRUS 2 (SARS-COV-2) (CORONAVIRUS DISEASE [COVID-19]), AMPLIFIED PROBE TECHNIQUE, MAKING USE OF HIGH THROUGHPUT TECHNOLOGIES AS DESCRIBED BY CMS-2020-01-R: HCPCS | Performed by: EMERGENCY MEDICINE

## 2020-10-31 RX ORDER — AMOXICILLIN 500 MG/1
500 CAPSULE ORAL 2 TIMES DAILY
Qty: 20 CAPSULE | Refills: 0 | Status: SHIPPED | OUTPATIENT
Start: 2020-10-31 | End: 2020-11-10

## 2020-10-31 RX ORDER — ACETAMINOPHEN 325 MG/1
975 TABLET ORAL ONCE
Status: COMPLETED | OUTPATIENT
Start: 2020-10-31 | End: 2020-10-31

## 2020-10-31 RX ORDER — IBUPROFEN 600 MG/1
600 TABLET, FILM COATED ORAL ONCE
Status: COMPLETED | OUTPATIENT
Start: 2020-10-31 | End: 2020-10-31

## 2020-10-31 RX ORDER — OXYMETAZOLINE HYDROCHLORIDE 0.05 G/100ML
2 SPRAY NASAL ONCE
Status: COMPLETED | OUTPATIENT
Start: 2020-10-31 | End: 2020-10-31

## 2020-10-31 RX ADMIN — IBUPROFEN 600 MG: 600 TABLET ORAL at 18:18

## 2020-10-31 RX ADMIN — ACETAMINOPHEN 975 MG: 325 TABLET, FILM COATED ORAL at 18:18

## 2020-10-31 RX ADMIN — OXYMETAZOLINE HYDROCHLORIDE 2 SPRAY: 0.05 SPRAY NASAL at 18:19

## 2020-10-31 NOTE — ED AVS SNAPSHOT
[facility] Emergency Dept  [address] [address]  Phone: [phone]  Fax: [phone]                                    Marion Latham   MRN: [id number]    Department: [facility] Emergency Dept   Date of Visit: 10/31/2020           After Visit Summary Signature Page    I have received my discharge instructions, and my questions have been answered. I have discussed any challenges I see with this plan with the nurse or doctor.    ..........................................................................................................................................  Patient/Patient Representative Signature      ..........................................................................................................................................  Patient Representative Print Name and Relationship to Patient    ..................................................               ................................................  Date                                   Time    ..........................................................................................................................................  Reviewed by Signature/Title    ...................................................              ..............................................  Date                                               Time          22EPIC Rev 08/18

## 2020-10-31 NOTE — ED TRIAGE NOTES
Pt complains of a sore throat and right ear pain for the last 2 days.Pt reports a fever that started last night.

## 2020-10-31 NOTE — ED PROVIDER NOTES
History     Chief Complaint:  Pharyngitis     HPI   Marion Latham is a 30 year old adult who presents with pharyngitis. Over the past two days the patient has developed right ear pain and a sore throat. Along with this the patient reports a minimal cough and tenderness when swallowing. He took Nyquil which helped only a little bit. He denies any vomiting, fever, abdominal pain, chest pain or rash. Of note, his twin brother was exposed to COVID-19 but otherwise has had no known sick exposure.    Allergies:  No Known Allergies     Medications:    Abilify  Ferrous sulfate  Lithium  Metformin  Depotestosterone    Past Medical History:    Obesity  Congenital deformity  Borderline personality disorder  Asthma  Gender dysphoria in adolescent and adult    Past Surgical History:    No past surgical history on file.    Family History:    Mother: Heart disease, Hypertension, Asthma, Psychiatry, Thyroid  Sister: Musculoskeletal     Social History:  Smoking Status: Never Smoker  Smokeless Tobacco: Never Used  Alcohol Use: Positive  PCP: Clinic, [facility]  Marital Status:  Single     Review of Systems   All other systems reviewed and are negative.      Physical Exam     Patient Vitals for the past 24 hrs:   BP Temp Temp src Pulse Resp SpO2   10/31/20 1855 (!) 150/93 -- -- 85 -- 100 %   10/31/20 1725 (!) 174/138 99.9  F (37.7  C) Oral 88 24 94 %       Physical Exam  General: Resting on the bed.  Head: No obvious trauma to head.  Ears, Nose, Throat:  External ears normal.  Nose normal.  + pharyngeal erythema, swelling or exudate.  Midline uvula.  No trismus.  Non tender cricothyroid membrane.  Right TM clear.  Wax obstructing her left TM   Eyes:  Conjunctivae clear.  Pupils are equal, round, and reactive.   Neck: Normal range of motion.  Neck supple.   CV: Regular rate and rhythm.  No murmurs.      Respiratory: Effort normal and breath sounds normal.  No wheezing or crackles.   Gastrointestinal: Soft.  No  distension. There is no tenderness.    Neuro: Alert. Moving all extremities appropriately.  Normal speech.    Skin: Skin is warm and dry.  No rash noted.     Emergency Department Course     Laboratory:  Laboratory findings were communicated with the patient who voiced understanding of the findings.    Rapid Strep Test: Positive (A)  Strep Culture: Pending    Symptomatic COVID-19 Virus (Coronavirus) by PCR Nasopharyngeal swab: Pending    Interventions:  1818 Tylenol 975 mg PO  1818 Ibuprofen 600mg PO  1819 Afrin 2 spray Nasal    Emergency Department Course:    Past medical records, nursing notes, and vitals reviewed.  I performed an exam of the patient and obtained history, as documented above.     I rechecked and updated the patient.     I personally reviewed the laboratory results with the Patient and answered all related questions prior to discharge.     Findings and plan explained to the Patient. Patient discharged home with instructions regarding supportive care, medications, and reasons to return. The importance of close follow-up was reviewed.     Impression & Plan      Medical Decision Making:  Marion Latham is a 30 year old adult who presents for evaluation of a sore throat and clinical evidence of pharyngitis.  Vital signs mildly hypertensive improved on repeat.  Broad differential was pursued including not limited to Harry's angina, epiglottitis, tracheitis, peritonsillar abscess, retropharyngeal abscess, strep pharyngitis, COVID-19, etc.  Ear looks clear no signs of obvious otitis.  No evidence of external tenderness to indicate otitis externa.  The rapid strep test is positive. There is no clinical evidence of peritonsillar abscess, retropharyngeal abscess, Lemierre's Syndrome, epiglottis, or Harry's angina.  No asymmetry, uvula midline, no trismus, do not suspect deep space infection, peritonsillar abscess, Harry's angina, etc.  Nontender over the cricothyroid membrane, not suggestive of  epiglottitis or tracheitis.  The patient's symptoms are consistent with streptococcal pharyngitis.  I have recommended treatment with antibiotics and analgesics.  Return if increasing pain, change in voice, neck pain, vomiting, fever, or shortness of breath. Follow-up with primary physician if not improving in 3-5 days.    Covid-19  Marion Latham was evaluated during a global COVID-19 pandemic, which necessitated consideration that the patient might be at risk for infection with the SARS-CoV-2 virus that causes COVID-19.   Applicable protocols for evaluation were followed during the patient's care.   COVID-19 was considered as part of the patient's evaluation. The plan for testing is:  a test was obtained during this visit.    Diagnosis:    ICD-10-CM    1. Acute streptococcal pharyngitis  J02.0        Disposition:   The patient is discharged to home.    Discharge Medications:  Discharge Medication List as of 10/31/2020  6:49 PM      START taking these medications    Details   amoxicillin (AMOXIL) 500 MG capsule Take 1 capsule (500 mg) by mouth 2 times daily for 10 days, Disp-20 capsule, R-0, E-Prescribe             Scribe Disclosure:  I, Marco Ochoa, am serving as a scribe at 5:27 PM on 10/31/2020 to document services personally performed by Massiel Brown MD based on my observations and the provider's statements to me.  [facility] EMERGENCY DEPT       Massiel Brown MD  10/31/20 3385

## 2020-10-31 NOTE — DISCHARGE INSTRUCTIONS
Please return to the ED if notice worsening pain, muffled voice, drooling, difficulty swallowing or opening your mouth, fevers >101 or other acute changes.  Please use tylenol and ibuprofen for pain.  Drink plenty of fluids and rest.      Discharge Instructions  Sore Throat  You were seen today for a sore throat.  Most (>80%) sore throats are caused by a virus. Antibiotics do not help with viral infections, but you can fight off the virus on your own.  In this case, your sore throat would be treated with medications for your pain and fever.    Strep throat is a kind of sore throat caused by Group A streptococcus bacteria.  This type of sore throat is treated with antibiotics.  If you had a rapid test done today for strep throat and it did not show infection, a culture is done in some cases. The culture can take several days to complete. If the culture shows you have strep throat, we will call you and get you a prescription for antibiotics. We will not contact you with a negative culture result.  Generally, every Emergency Department visit should have a follow-up clinic visit with either a primary or a specialty clinic/provider. Please follow-up as instructed by your emergency provider today.  Return to the Emergency Department if:  If you have difficulty breathing.  If you are drooling because you are unable to swallow.  You become dehydrated due to difficulty drinking. Signs of dehydration include weakness, dry mouth, and urinating less than 3 times per day.  If you develop swelling of the neck or tongue.  If you develop a high fever with either severe or unusual headache or stiff neck.    Treatment:    Pain relief -- Non-prescription pain medications, such as Tylenol  (acetaminophen) or Motrin , Advil  (ibuprofen) are usually recommended for pain.  Do not use a medicine that you are allergic to, or if your provider has told you not to use it.  Soft or liquid diet. Concentrate on liquids to keep yourself hydrated.  Cold liquids (popsicles, ice cream, etc.) may feel good on your throat.  If you were given a prescription for medicine here today, be sure to read all of the information (including the package insert) that comes with your prescription.  This will include important information about the medicine, its side effects, and any warnings that you need to know about.  The pharmacist who fills the prescription can provide more information and answer questions you may have about the medicine.  If you have questions or concerns that the pharmacist cannot address, please call or return to the Emergency Department.   Remember that you can always come back to the Emergency Department if you are not able to see your regular provider in the amount of time listed above, if you get any new symptoms, or if there is anything that worries you.

## 2020-11-02 LAB
SARS-COV-2 RNA SPEC QL NAA+PROBE: NOT DETECTED
SPECIMEN SOURCE: NORMAL

## 2020-11-04 ENCOUNTER — NURSE TRIAGE (OUTPATIENT)
Dept: NURSING | Facility: CLINIC | Age: 30
End: 2020-11-04

## 2020-11-04 NOTE — TELEPHONE ENCOUNTER

## 2020-11-23 ENCOUNTER — HOSPITAL ENCOUNTER (EMERGENCY)
Facility: CLINIC | Age: 30
Discharge: HOME | End: 2020-11-23
Attending: EMERGENCY MEDICINE | Admitting: EMERGENCY MEDICINE
Payer: OTHER GOVERNMENT

## 2020-11-23 VITALS
RESPIRATION RATE: 18 BRPM | HEART RATE: 66 BPM | TEMPERATURE: 97.4 F | DIASTOLIC BLOOD PRESSURE: 94 MMHG | SYSTOLIC BLOOD PRESSURE: 145 MMHG | HEIGHT: 66 IN | OXYGEN SATURATION: 98 % | BODY MASS INDEX: 40.35 KG/M2

## 2020-11-23 DIAGNOSIS — B96.89 BACTERIAL VAGINOSIS: ICD-10-CM

## 2020-11-23 DIAGNOSIS — N72 CERVICITIS: ICD-10-CM

## 2020-11-23 DIAGNOSIS — J02.9 ACUTE PHARYNGITIS, UNSPECIFIED ETIOLOGY: ICD-10-CM

## 2020-11-23 DIAGNOSIS — N76.0 BACTERIAL VAGINOSIS: ICD-10-CM

## 2020-11-23 DIAGNOSIS — J02.0 STREPTOCOCCAL PHARYNGITIS: ICD-10-CM

## 2020-11-23 LAB
DEPRECATED S PYO AG THROAT QL EIA: POSITIVE
HCG UR QL: NEGATIVE
SARS-COV-2 RNA SPEC QL NAA+PROBE: NOT DETECTED
SPECIMEN SOURCE: ABNORMAL
SPECIMEN SOURCE: ABNORMAL
SPECIMEN SOURCE: NORMAL
WET PREP SPEC: ABNORMAL

## 2020-11-23 PROCEDURE — 81025 URINE PREGNANCY TEST: CPT | Performed by: EMERGENCY MEDICINE

## 2020-11-23 PROCEDURE — 96372 THER/PROPH/DIAG INJ SC/IM: CPT | Performed by: EMERGENCY MEDICINE

## 2020-11-23 PROCEDURE — 87591 N.GONORRHOEAE DNA AMP PROB: CPT | Performed by: EMERGENCY MEDICINE

## 2020-11-23 PROCEDURE — 87491 CHLMYD TRACH DNA AMP PROBE: CPT | Performed by: EMERGENCY MEDICINE

## 2020-11-23 PROCEDURE — 87210 SMEAR WET MOUNT SALINE/INK: CPT | Performed by: EMERGENCY MEDICINE

## 2020-11-23 PROCEDURE — 250N000013 HC RX MED GY IP 250 OP 250 PS 637: Performed by: EMERGENCY MEDICINE

## 2020-11-23 PROCEDURE — 250N000011 HC RX IP 250 OP 636: Performed by: EMERGENCY MEDICINE

## 2020-11-23 PROCEDURE — C9803 HOPD COVID-19 SPEC COLLECT: HCPCS

## 2020-11-23 PROCEDURE — 99284 EMERGENCY DEPT VISIT MOD MDM: CPT | Mod: 25

## 2020-11-23 PROCEDURE — 250N000009 HC RX 250: Performed by: EMERGENCY MEDICINE

## 2020-11-23 PROCEDURE — U0003 INFECTIOUS AGENT DETECTION BY NUCLEIC ACID (DNA OR RNA); SEVERE ACUTE RESPIRATORY SYNDROME CORONAVIRUS 2 (SARS-COV-2) (CORONAVIRUS DISEASE [COVID-19]), AMPLIFIED PROBE TECHNIQUE, MAKING USE OF HIGH THROUGHPUT TECHNOLOGIES AS DESCRIBED BY CMS-2020-01-R: HCPCS | Performed by: EMERGENCY MEDICINE

## 2020-11-23 PROCEDURE — 87880 STREP A ASSAY W/OPTIC: CPT | Performed by: EMERGENCY MEDICINE

## 2020-11-23 RX ORDER — AZITHROMYCIN 250 MG/1
1000 TABLET, FILM COATED ORAL ONCE
Status: COMPLETED | OUTPATIENT
Start: 2020-11-23 | End: 2020-11-23

## 2020-11-23 RX ORDER — METRONIDAZOLE 500 MG/1
500 TABLET ORAL 2 TIMES DAILY
Qty: 14 TABLET | Refills: 1 | Status: SHIPPED | OUTPATIENT
Start: 2020-11-23 | End: 2020-11-30

## 2020-11-23 RX ORDER — CEFDINIR 300 MG/1
300 CAPSULE ORAL 2 TIMES DAILY
Qty: 20 CAPSULE | Refills: 0 | Status: SHIPPED | OUTPATIENT
Start: 2020-11-23 | End: 2020-12-03

## 2020-11-23 RX ADMIN — AZITHROMYCIN MONOHYDRATE 1000 MG: 250 TABLET ORAL at 09:37

## 2020-11-23 RX ADMIN — LIDOCAINE HYDROCHLORIDE 250 MG: 10 INJECTION, SOLUTION EPIDURAL; INFILTRATION; INTRACAUDAL; PERINEURAL at 09:36

## 2020-11-23 ASSESSMENT — ENCOUNTER SYMPTOMS
SORE THROAT: 1
VOMITING: 0
DIARRHEA: 0
NAUSEA: 0
FEVER: 1
ABDOMINAL PAIN: 0

## 2020-11-23 NOTE — ED AVS SNAPSHOT
[facility] Emergency Dept  [address] [address]  Phone: [phone]  Fax: [phone]                                    Marion Latham   MRN: [id number]    Department: [facility] Emergency Dept   Date of Visit: 11/23/2020           After Visit Summary Signature Page    I have received my discharge instructions, and my questions have been answered. I have discussed any challenges I see with this plan with the nurse or doctor.    ..........................................................................................................................................  Patient/Patient Representative Signature      ..........................................................................................................................................  Patient Representative Print Name and Relationship to Patient    ..................................................               ................................................  Date                                   Time    ..........................................................................................................................................  Reviewed by Signature/Title    ...................................................              ..............................................  Date                                               Time          22EPIC Rev 08/18        - - -

## 2020-11-23 NOTE — LETTER
November 23, 2020      To Whom It May Concern:      Marion Latham was seen in our Emergency Department today, 11/23/20 .  Marion can return to work after COVID test results return in 1-4 days.      Sincerely,        Kanchan CASH RN

## 2020-11-23 NOTE — ED PROVIDER NOTES
"  History   Chief Complaint  Pharyngitis    HPI   Marion Latham is a 30 year old adult with a history of Bipolar disorder who presents for evaluation of sore throat, ear pain with swallowing, fever up to 100.9, as well as vaginal discharge.  Patient states that with his last sexual partner the condom broke and he knew they were negative for HIV but was unsure whether they had chlamydia or gonorrhea. He states the last 4 days he has had yellowish vaginal discharge.  He denies abdominal pain, nausea, vomiting, diarrhea.  He had a normal bowel movement yesterday.  Patient states that he was treated for strep pharyngitis October 31 and that he finished the antibiotics, however he did not change his toothbrush and feels he has been reinfected.  He states he feels that he did when he had pharyngitis.  Denies loss of taste or smell.  Able to handle secretions.  Last used Tylenol last evening around 10:30 PM.     Allergies:  No Known Drug Allergies     Medications:    Abilify  Ferrous sulfate  Lithium  Metformin  Depotestosterone      Past Medical History:    Gender dysphoria  Asthma  Bipolar I disorder  Congenital deformity  Obesity  Borderline personality disorder     Past Surgical History:    The patient does not have any pertinent past surgical history.     Family History:    Mother: heart disease, hypertension     Social History:  Smoking Status: Never  Smokeless Tobacco: Never  Alcohol Use: Not currently  Drug Use: Not on file  Marital Status:        Review of Systems   Constitutional: Positive for fever.   HENT: Positive for ear pain and sore throat.    Gastrointestinal: Negative for abdominal pain, diarrhea, nausea and vomiting.   Genitourinary:        Vaginal discharge   All other systems reviewed and are negative.    Physical Exam     Patient Vitals for the past 24 hrs:   BP Temp Temp src Pulse Resp SpO2 Height   11/23/20 0727 138/86 97.4  F (36.3  C) Temporal 102 18 98 % 1.676 m (5' 6\") "     Physical Exam  General: Patient is alert and normal appearing.  HEENT: Head atraumatic    Eyes: pupils equal and reactive. Conjunctiva clear   Nares: patent   Oropharynx: Posterior pharynx erythema, uvula midline, no palatal draping, normal voice, no trismus  Ears: Bilateral cerumen impaction  Neck: Supple without lymphadenopathy, no meningismus  Chest: Heart regular rate and rhythm.   Lungs: Equal clear to auscultation with no wheeze or rales  Abdomen: Soft, non tender, nondistended, normal bowel sounds  Back: No costovertebral angle tenderness, no midline C, T or L spine tenderness  Neuro: Grossly nonfocal, normal speech, strength equal bilaterally, CN 2-12 intact  Extremities: No deformities, equal radial and DP pulses. No clubbing, cyanosis.  No edema  Skin: Warm and dry with no rash.   : Chaperoned pelvic exam normal female anatomy, friable cervix, large amount of vaginal discharge yellow, cervical motion tenderness    Emergency Department Course   Laboratory:  Laboratory findings were communicated with the patient who voiced understanding of the findings.     HCG qualitative urine: negative  Wet prep: clue cells seen (A), o/w WNL  Chlamydia trachomatis: pending  Neisseria gonorrhea: pending    Streptococcus A Rapid Scr w Reflx to PCR: positive (A)    Symptomatic COVID-19 Virus (Coronavirus) by PCR: pending    Interventions:  0936 Zithromax 1,000 mg PO  0937 Rocephin 250 mg in 1% Lidocaine intramuscular     Emergency Department Course:  Past medical records, nursing notes, and vitals reviewed.    0726 I physically examined the patient as documented above.     I performed a pelvic exam.      The patient provided a urine sample here in the emergency department. This was sent for laboratory testing, findings above.     A Nasopharyngeal swab was obtained here in the ED.    0840 I rechecked the patient and discussed the findings of their workup thus far.     Findings and plan explained to the Patient. Patient  discharged home with instructions regarding supportive care, medications, and reasons to return. The importance of close follow-up was reviewed. The patient was prescribed Omnicef and Flagyl.    I personally reviewed the laboratory and imaging results with the Patient and answered all related questions prior to discharge.     Impression & Plan   Medical Decision Making:  Patient is a 30-year-old transgender male to female whose not undergone any surgical changes who presents to the emergency department with sore throat, fever, vaginal discharge.  He states that he had intercourse 4 days ago and the condom broke.  Now is having yellow vaginal discharge.  Work-up in the emergency department was undertaken as noted above.  Patient does have evidence of strep pharyngitis.  Recently completed treatment but states reexposure.  Will treat again but use cefdinir this time for possible resistance.  Pelvic exam was undertaken as noted above.  Cervix was noted to be friable and there was a large amount of mucopurulent discharge.  Patient will be empirically treated for gonorrhea and chlamydia.  Wet prep shows evidence of clue cells therefore will be discharged with Flagyl as well.  Patient understands the need for complete STD testing as an outpatient.  Patient is hemodynamically stable at this time.  No systemic signs of infection including no nausea, vomiting, diarrhea.  Patient is afebrile here now with a recorded low-grade fevers at home.  Patient is agreeable to plan for discharge.  Return precautions the emergency department reviewed and all questions and concerns addressed.    Covid-19  This patient was evaluated during a global COVID-19 pandemic, which necessitated consideration that the patient might be at risk for infection with the SARS-CoV-2 virus that causes COVID-19.   Applicable protocols for evaluation were followed during the patient's care. COVID-19 was considered as part of the patient's evaluation. The plan  for testing is: a test was obtained during this visit.    Diagnosis:    ICD-10-CM    1. Cervicitis  N72 Wet prep     Streptococcus A Rapid Scr w Reflx to PCR     HCG qualitative urine   2. Acute pharyngitis, unspecified etiology  J02.9    3. Bacterial vaginosis  N76.0     B96.89    4. Streptococcal pharyngitis  J02.0      Disposition:  Discharged to home.    Discharge Medications:  New Prescriptions    CEFDINIR (OMNICEF) 300 MG CAPSULE    Take 1 capsule (300 mg) by mouth 2 times daily for 10 days    METRONIDAZOLE (FLAGYL) 500 MG TABLET    Take 1 tablet (500 mg) by mouth 2 times daily for 7 days       Scribe Disclosure:  I, Jonathan Ferreira, am serving as a scribe at 7:24 AM on 11/23/2020 to document services personally performed by Erika Carreno MD based on my observations and the provider's statements to me.      Erika Carreno MD  11/23/20 0957

## 2020-11-24 ENCOUNTER — TELEPHONE (OUTPATIENT)
Dept: EMERGENCY MEDICINE | Facility: CLINIC | Age: 30
End: 2020-11-24

## 2020-11-24 LAB
C TRACH DNA SPEC QL NAA+PROBE: POSITIVE
N GONORRHOEA DNA SPEC QL NAA+PROBE: POSITIVE
SPECIMEN SOURCE: ABNORMAL
SPECIMEN SOURCE: ABNORMAL

## 2020-11-24 NOTE — LETTER
November 26, 2020        Marion Latham  [address]   [place] [address]          Dear Marion Latham:    You were seen in the [place] [facility] at [facility] EMERGENCY DEPT on 11/23/2020.  We are unable to reach you by phone, so we are sending you this letter.     It is important that you call [place] [facility] lab result nurse at [phone], as we have to make some changes in your treatment.     Best time to call back is between 10 a.m. and 6 p.m, 7 days a week.      Sincerely,     Priti  [place] [facility] Lab Result RN  [phone]

## 2020-11-24 NOTE — TELEPHONE ENCOUNTER
[facility] Emergency Department/Urgent Care Lab result notification:    [place] ED lab result protocol used  N. Gonorrhea and Chlamydia protocols    Reason for call  Notify of lab results, assess symptoms,  review ED providers recommendations/discharge instructions (if necessary) and advise per ED lab result f/u protocol    Lab Result   Final N. Gonorrhoeae PCR is [POSITIVE] AND Chlamydia T PCR is [POSITIVE]  Patient was treated for N. Gonorrhea AND/OR Chlamydia T in the ED  [Yes or No]:  Yes       If Yes, list what was given in the ED (dual treatment for N Gonorrhea):  Azithromycin 1000 mg PO x 1 AND Ceftriaxone (Rocephin) 250 mg IM x 1  [facility] ED discharge antibiotic (if prescribed): Cefdinir and Flagyl  If treated appropriately in the [place] ED, notify patient of result and STD instructions.  Information table from ED Provider visit on 11/23/2020  Symptoms reported at ED visit (Chief complaint, HPI) Pharyngitis     HPI   Marion Latham is a 30 year old adult with a history of Bipolar disorder who presents for evaluation of sore throat, ear pain with swallowing, fever up to 100.9, as well as vaginal discharge.  Patient states that with his last sexual partner the condom broke and he knew they were negative for HIV but was unsure whether they had chlamydia or gonorrhea. He states the last 4 days he has had yellowish vaginal discharge.  He denies abdominal pain, nausea, vomiting, diarrhea.  He had a normal bowel movement yesterday.  Patient states that he was treated for strep pharyngitis October 31 and that he finished the antibiotics, however he did not change his toothbrush and feels he has been reinfected.  He states he feels that he did when he had pharyngitis.  Denies loss of taste or smell.  Able to handle secretions.  Last used Tylenol last evening around 10:30 PM.    ED providers Impression and Plan (applicable information) Medical Decision Making:  Patient is a  30-year-old transgender male to female whose not undergone any surgical changes who presents to the emergency department with sore throat, fever, vaginal discharge.  He states that he had intercourse 4 days ago and the condom broke.  Now is having yellow vaginal discharge.  Work-up in the emergency department was undertaken as noted above.  Patient does have evidence of strep pharyngitis.  Recently completed treatment but states reexposure.  Will treat again but use cefdinir this time for possible resistance.  Pelvic exam was undertaken as noted above.  Cervix was noted to be friable and there was a large amount of mucopurulent discharge.  Patient will be empirically treated for gonorrhea and chlamydia.  Wet prep shows evidence of clue cells therefore will be discharged with Flagyl as well.  Patient understands the need for complete STD testing as an outpatient.  Patient is hemodynamically stable at this time.  No systemic signs of infection including no nausea, vomiting, diarrhea.  Patient is afebrile here now with a recorded low-grade fevers at home.  Patient is agreeable to plan for discharge.  Return precautions the emergency department reviewed and all questions and concerns addressed.     Covid-19  This patient was evaluated during a global COVID-19 pandemic, which necessitated consideration that the patient might be at risk for infection with the SARS-CoV-2 virus that causes COVID-19.   Applicable protocols for evaluation were followed during the patient's care. COVID-19 was considered as part of the patient's evaluation. The plan for testing is: a test was obtained during this visit.   Miscellaneous information Covid 19 negative     RN Assessment (Patient s current Symptoms), include time called.  [Insert Left message here if message left]  1:46PM: Left voicemail message requesting a call back to [facility] ED Lab Result RN at [phone].  RN is available every day between 10 a.m. and 6:30  p.mPati.    [RN Name]  Sangeeta Carney RN  [facility] RN  Lung Nodule and ED Lab Result RN  Epic pool (ED late result f/u RN): [id number]  [facility] INCIDENTAL RADIOLOGY F/U NURSES: [id number]  Ph# [phone]      Copy of Lab result

## 2020-11-27 NOTE — TELEPHONE ENCOUNTER
[facility] Emergency Department/Urgent Care Lab result notification     Patient/parent Name  Herbert aldridge    RN Assessment (Patient s current Symptoms), include time called.  [Insert Left message here if message left]  Sx's have resolved    Lab result (if applicable):  Component      Latest Ref Rng & Units 11/23/2020   Specimen Description       Endocervical   Chlamydia Trachomatis PCR      NEG:Negative Positive (A)   Specimen Descrip       Endocervical   N Gonorrhea PCR      NEG:Negative Positive (A)   Strep Specimen Description       Throat   Streptococcus Group A Rapid Screen      NEG:Negative Positive (A)   COVID-19 Virus PCR to [facility] - Source       Oropharyngeal   COVID-19 Virus PCR to [facility] - Result       Not Detected     RN Recommendations/Instructions per [place] ED lab result protocol  Patient notified of lab result and treatment recommendations.    STD Patient Instructions:    We recommend that you contact any recent sexual partners within the last 2 months and have them evaluated by a physician.    Avoid sexual activity for 7 to 10 days or until both your and your partner(s) have completed all antibiotic medications.    We advise that you consider following up with your PCP at approximately 3 months for retesting to be sure the infection has cleared.    [RN Name]  Carlito Fisher RN  [facility]  Emergency Dept Lab Result RN  Ph# [phone]

## 2020-12-04 ENCOUNTER — NURSE TRIAGE (OUTPATIENT)
Dept: NURSING | Facility: CLINIC | Age: 30
End: 2020-12-04

## 2020-12-04 NOTE — TELEPHONE ENCOUNTER
"Pt is calling in about having strep throat since November. Pt was first prescribed antibiotic(Amoxicillin), and then started having symptoms again after completing that antibiotic, and then was put on another antibiotic (Cefdinir). Pt is negative for Covid 19.   Pt has had a fever for the past 2 days of 100.1, body aches, and chills. Pt rated sore throat pain \"6\" now, after Tylenol. Pt finished second antibiotic yesterday.  Care advice given, increasing fluids, use of warm chicken broth, and per protocol pt should be evaluated within 24 hours by a physician. Pt agrees with plan, and was transferred to scheduling to make an appointment. Pt was advised to go to Urgent Care if he is unable get an appointment today. Pt was able to get an appointment for today at 420 pm. Pt verbalized understanding.    Frankie Luis RN on 12/4/2020 at 12:41 PM      Additional Information    Negative: Severe difficulty breathing (e.g., struggling for each breath, speaks in single words, stridor)    Negative: Sounds like a life-threatening emergency to the triager    Negative: [1] Drooling or spitting out saliva (because can't swallow) AND [2] new onset    Negative: Unable to open mouth completely    Negative: Difficulty breathing (per caller) but not severe    Negative: [1] Fever > 103 F (39.4 C) AND [2] took antibiotic > 24 hours    Negative: [1] Drinking very little AND [2] dehydration suspected (e.g., no urine > 12 hours, very dry mouth, very lightheaded)    Negative: [1] Refuses to drink anything AND [2] for > 12 hours    Negative: Patient sounds very sick or weak to the triager    Negative: [1] Taking antibiotic > 24 hours for strep throat AND [2] sore throat pain is SEVERE    Negative: [1] Taking antibiotic > 48 hours (2 days) for strep throat AND [2] fever persists    [1] Taking antibiotic > 72 hours (3 days) for strep throat AND [2] sore throat not improved    Protocols used: STREP THROAT INFECTION ON ANTIBIOTIC FOLLOW-UP " CALL-A-AH

## 2020-12-06 ENCOUNTER — HEALTH MAINTENANCE LETTER (OUTPATIENT)
Age: 30
End: 2020-12-06

## 2021-03-23 ENCOUNTER — TELEPHONE (OUTPATIENT)
Dept: PLASTIC SURGERY | Facility: CLINIC | Age: 31
End: 2021-03-23

## 2021-03-23 NOTE — TELEPHONE ENCOUNTER
Called pt regarding interest in top surgery. No answer, LVM with [facility] contact information.     Jesús San

## 2021-05-30 VITALS — WEIGHT: 266 LBS

## 2021-06-08 NOTE — PROGRESS NOTES
Provider wore a mask during this visit.     Subjective:   Marion Latham is a 26 y.o. female and is new to [facility].  Roomed by: Jailyn    Accompanied by Spouse wife     Chief Complaint   Patient presents with     Shortness of Breath     WOke up this am with ST and HA, then other Sx started.      Sore Throat     Headache     Back Pain     Abdominal Pain   Symptoms started on 2/8. Started with headache. Throat was itchy, but not more dry and painful. Has been drinking a lot of water. Felt febrile yesterday and chills today. Denies any ill contacts. Left work early today. Does retail at [employer]. Coughing started 2/9. Says back and belly pain has been there for about 3 days, but worse today. Denies any dysuria, but admits urgency and frequently. Denies any CP, but admit some SOB. Energy and appetite have been down since 2/8. Admits nausea, vomiting, and loose stools. This improved last night. Sleeping more poorly in last 3-4 days, but sleeping more. Admits body aches on 2/8. Admits nasal congestion.  Primary clinic just got changed to [facility], but has not yet established primary care.   PMH - See Problem list  PSH - foot and knee surgery as a child  FX - HTN - maternal grandparents, CAD - maternal GF, paternal uncle, DM - maternal aunt, Stroke - mother  Review of Systems  Const - Resp - see HPI  No Known Allergies    Current Outpatient Prescriptions:      TESTOSTERONE IM, Inject into the shoulder, thigh, or buttocks., Disp: , Rfl:      ABILIFY 15 mg tablet, TAKE 1 TABLET BY MOUTH DAILY, Disp: 30 tablet, Rfl: 0     lithium (LITHOBID) 300 MG CR tablet, TAKE 1 TABLET BY MOUTH THREE TIMES DAILY., Disp: 90 tablet, Rfl: 0     multivitamin with iron (ONE DAILY WITH IRON) Tab tablet, Take 1 tablet by mouth daily., Disp: , Rfl:   Patient Active Problem List   Diagnosis     Insomnia     Gender Identity Disorder     Bipolar Disorder     Borderline Personality Disorder     Medical History  Reviewed  Objective:     Vitals:    02/13/17 1553   BP: 130/72   Pulse: 72   Resp: 14   Temp: 98.5  F (36.9  C)   TempSrc: Oral   SpO2: 100%   Weight: (!) 266 lb (120.7 kg)   .  Results for orders placed or performed in visit on 02/13/17   Rapid Strep A Screen-Throat   Result Value Ref Range    Rapid Strep A Antigen No Group A Strep detected No Group A Strep detected   Lab result discussed on day of visit.      Assessment - Plan   1. Viral syndrome  Supportive cares discussed.     2. Flu-like symptoms    3. Sore throat  - Rapid Strep A Screen-Throat  - Group A Strep, RNA Direct Detection, Throat      At the conclusion of the encounter, assessment and plan were discussed. All questions were answered. The patient or guardian acknowledged understanding and was involved in the decision making regarding the overall care plan.    Patient Instructions   1. Keep well hydrated  2. Tylenol or ibuprofen for fever or pain  3. If symptoms have not improved after 5-7 days, follow up with a primary provider  4. If you have any questions, call the clinic number       Viral Syndrome   GENERAL INFORMATION:   What is viral syndrome? Viral syndrome is a term caregivers use for general symptoms of a viral infection that has no clear cause.   What are the signs and symptoms of viral syndrome? Signs and symptoms may start slowly or suddenly and last hours to days. They can be mild to severe and can change over days or hours.   Fever and chills, or a rash    Runny or stuffy nose     Cough, sore throat, or hoarseness     Headache, or pain and pressure around your eyes     Muscle aches and joint pain     Shortness of breath or wheezing     Abdominal pain, cramps, and diarrhea     Nausea, vomiting, or loss of appetite   How is viral syndrome treated? An illness caused by a virus usually goes away in 10 to 14 days without treatment. The following medicines may be given to help manage your signs and symptoms:   Antipyretics: These reduce  fever.    Antihistamines: These help relieve a rash, itching, and trouble breathing.     Decongestants: These decrease a stuffy nose so that you can breathe more easily.     Antitussives: These help control a cough.     Antiviral medicine: These help kill the virus ( like influenza) and control symptoms.    What can I do to help prevent the spread of viral syndrome? Viruses are spread easily from person to person through the air and on shared items. You can spread a virus to other people for weeks after your symptoms go away. The following are ways to prevent the spread of a virus:   Wash your hands: Wash your hands often with soap and water or use an alcohol-based gel. Wash your hands after you touch someone who is sick.     Wear a mask: A mask can help you prevent the spread of a virus. If you need to wear a mask, ask your caregiver where to get one.     Cook and handle food properly: Cook food completely through. Clean food preparation surfaces with a disinfectant.     When should I contact my caregiver? Contact your caregiver if:   Your symptoms get worse after 5 to 7 days.     Your symptoms do not go away within 10 days.    You have thick drainage or pus coming out of 1 or both nostrils and pain in one side of your face.    You have a fever and pain.    You have green sputum.  When should I seek immediate care? Seek care immediately or call 911 if:   You have continued vomiting and diarrhea.    You have chest pain or trouble breathing.

## 2021-09-26 ENCOUNTER — HEALTH MAINTENANCE LETTER (OUTPATIENT)
Age: 31
End: 2021-09-26

## 2022-01-16 ENCOUNTER — HEALTH MAINTENANCE LETTER (OUTPATIENT)
Age: 32
End: 2022-01-16

## 2023-04-23 ENCOUNTER — HEALTH MAINTENANCE LETTER (OUTPATIENT)
Age: 33
End: 2023-04-23